# Patient Record
Sex: FEMALE | Race: BLACK OR AFRICAN AMERICAN | NOT HISPANIC OR LATINO | Employment: FULL TIME | URBAN - METROPOLITAN AREA
[De-identification: names, ages, dates, MRNs, and addresses within clinical notes are randomized per-mention and may not be internally consistent; named-entity substitution may affect disease eponyms.]

---

## 2017-09-15 ENCOUNTER — HOSPITAL ENCOUNTER (EMERGENCY)
Facility: OTHER | Age: 32
Discharge: HOME | End: 2017-09-15
Attending: EMERGENCY MEDICINE
Payer: MEDICARE

## 2017-09-15 VITALS
SYSTOLIC BLOOD PRESSURE: 138 MMHG | OXYGEN SATURATION: 98 % | TEMPERATURE: 99 F | BODY MASS INDEX: 27.99 KG/M2 | HEIGHT: 65 IN | WEIGHT: 168 LBS | HEART RATE: 97 BPM | DIASTOLIC BLOOD PRESSURE: 78 MMHG | RESPIRATION RATE: 18 BRPM

## 2017-09-15 DIAGNOSIS — N64.4 PAIN OF BOTH BREASTS: Primary | ICD-10-CM

## 2017-09-15 LAB
B-HCG UR QL: NEGATIVE
CTP QC/QA: YES

## 2017-09-15 PROCEDURE — 99283 EMERGENCY DEPT VISIT LOW MDM: CPT | Mod: 25

## 2017-09-15 PROCEDURE — 81025 URINE PREGNANCY TEST: CPT | Performed by: EMERGENCY MEDICINE

## 2017-09-15 PROCEDURE — 25000003 PHARM REV CODE 250: Performed by: EMERGENCY MEDICINE

## 2017-09-15 RX ORDER — HYDROCODONE BITARTRATE AND ACETAMINOPHEN 10; 325 MG/1; MG/1
TABLET ORAL
COMMUNITY
End: 2017-10-08

## 2017-09-15 RX ORDER — KETOROLAC TROMETHAMINE 10 MG/1
10 TABLET, FILM COATED ORAL EVERY 6 HOURS
Qty: 20 TABLET | Refills: 0 | Status: SHIPPED | OUTPATIENT
Start: 2017-09-15 | End: 2017-10-08

## 2017-09-15 RX ORDER — KETOROLAC TROMETHAMINE 10 MG/1
10 TABLET, FILM COATED ORAL
Status: COMPLETED | OUTPATIENT
Start: 2017-09-15 | End: 2017-09-15

## 2017-09-15 RX ADMIN — KETOROLAC TROMETHAMINE 10 MG: 10 TABLET, FILM COATED ORAL at 04:09

## 2017-09-15 NOTE — ED PROVIDER NOTES
"Encounter Date: 9/15/2017       History     Chief Complaint   Patient presents with    Breast Pain     patient reports having pain and lumps in each breast X2 weeks , patient reports pain as constant pain      Chief complaint: Breast pain  31-year-old says that she has pain to both breast for 2 weeks.  She has a history of lumps in her breasts in the past.  She noted the "lumps and pain"  2 weeks ago.  She has not taken anything for the pain.  No nipple discharge.  She has never had a mammogram.  She had an aunt and a grandmother with breast cancer but no first-degree relatives          Review of patient's allergies indicates:  No Known Allergies  History reviewed. No pertinent past medical history.  History reviewed. No pertinent surgical history.  Family History   Problem Relation Age of Onset    Hypertension Sister      Social History   Substance Use Topics    Smoking status: Current Every Day Smoker    Smokeless tobacco: Not on file    Alcohol use No     Review of Systems   Constitutional: Negative for fever.   Respiratory: Negative for shortness of breath.    Cardiovascular: Positive for chest pain.   Musculoskeletal:        Bilateral breast pain   Skin: Negative for color change and rash.       Physical Exam     Initial Vitals [09/15/17 1535]   BP Pulse Resp Temp SpO2   (!) 142/89 88 18 97.9 °F (36.6 °C) 100 %      MAP       106.67         Physical Exam    Nursing note and vitals reviewed.  Constitutional: No distress.   Cardiovascular: Normal rate.   Pulmonary/Chest: Breath sounds normal.   Mild tenderness to breasts, no definitive masses, no warmth, normal nipples   Neurological: She is alert and oriented to person, place, and time.   Skin: Skin is warm and dry.   Psychiatric: She has a normal mood and affect.         ED Course   Procedures  Labs Reviewed   POCT URINE PREGNANCY             Medical Decision Making:   Initial Assessment:   31-year-old who complains of pain to both breasts.  On exam it did " not detect any discrete masses but she does have tenderness.  No evidence of abscess  ED Management:  Patient will be treated with Toradol and referred to primary care physician for outpatient mammogram.  Unfortunately there is no definitive test that can be performed in this emergency department today.                   ED Course      Clinical Impression:   The encounter diagnosis was Pain of both breasts.                           Brynn Rae MD  09/15/17 1640

## 2017-09-15 NOTE — ED NOTES
Pt no answer for triage, not in waiting room. Per registration, pt left stating she was going to  her kids and Dr. Farrell said it was ok to leave the patient on the board. Registration to notifiy rn when pt returns.

## 2017-09-15 NOTE — ED NOTES
Patient has verified the spelling of their name and  on armband    LOC: The patient is awake, alert, and aware of environment with an appropriate affect, the patient is oriented x 4 and speaking appropriately.     APPEARANCE: Patient resting comfortably and in no acute distress, patient is clean and well groomed, patient's clothing is properly fastened.     SKIN: Bilateral breast pain.      COMPLAINT: Patient complain of bilateral chronic breast pain. Denies any trauma.

## 2017-10-08 ENCOUNTER — HOSPITAL ENCOUNTER (EMERGENCY)
Facility: OTHER | Age: 32
Discharge: HOME | End: 2017-10-08
Attending: INTERNAL MEDICINE
Payer: MEDICARE

## 2017-10-08 VITALS
SYSTOLIC BLOOD PRESSURE: 138 MMHG | TEMPERATURE: 99 F | HEART RATE: 92 BPM | DIASTOLIC BLOOD PRESSURE: 87 MMHG | RESPIRATION RATE: 18 BRPM | OXYGEN SATURATION: 98 %
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
## 2017-10-08 DIAGNOSIS — S16.1XXA STRAIN OF NECK MUSCLE, INITIAL ENCOUNTER: Primary | ICD-10-CM

## 2017-10-08 LAB
B-HCG UR QL: NEGATIVE
CTP QC/QA: YES

## 2017-10-08 PROCEDURE — 81025 URINE PREGNANCY TEST: CPT | Performed by: INTERNAL MEDICINE

## 2017-10-08 PROCEDURE — 25000003 PHARM REV CODE 250: Performed by: INTERNAL MEDICINE

## 2017-10-08 PROCEDURE — 99284 EMERGENCY DEPT VISIT MOD MDM: CPT | Mod: 25

## 2017-10-08 RX ORDER — IBUPROFEN 800 MG/1
800 TABLET ORAL EVERY 8 HOURS PRN
Qty: 30 TABLET | Refills: 0 | Status: SHIPPED | OUTPATIENT
Start: 2017-10-08 | End: 2018-03-28 | Stop reason: SDUPTHER

## 2017-10-08 RX ORDER — METHOCARBAMOL 750 MG/1
1500 TABLET, FILM COATED ORAL 3 TIMES DAILY
Qty: 30 TABLET | Refills: 0 | Status: SHIPPED | OUTPATIENT
Start: 2017-10-08 | End: 2017-10-13

## 2017-10-08 RX ORDER — ACETAMINOPHEN 500 MG
1000 TABLET ORAL
Status: COMPLETED | OUTPATIENT
Start: 2017-10-08 | End: 2017-10-08

## 2017-10-08 RX ADMIN — ACETAMINOPHEN 1000 MG: 500 TABLET ORAL at 10:10
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
## 2018-03-11 ENCOUNTER — HOSPITAL ENCOUNTER (EMERGENCY)
Facility: OTHER | Age: 33
Discharge: HOME | End: 2018-03-11
Attending: EMERGENCY MEDICINE
Payer: MEDICARE

## 2018-03-11 VITALS
RESPIRATION RATE: 16 BRPM | OXYGEN SATURATION: 99 % | WEIGHT: 155 LBS | DIASTOLIC BLOOD PRESSURE: 95 MMHG | HEIGHT: 65 IN | TEMPERATURE: 98 F | SYSTOLIC BLOOD PRESSURE: 162 MMHG | HEART RATE: 86 BPM | BODY MASS INDEX: 25.83 KG/M2

## 2018-03-11 DIAGNOSIS — H10.31 ACUTE CONJUNCTIVITIS OF RIGHT EYE, UNSPECIFIED ACUTE CONJUNCTIVITIS TYPE: Primary | ICD-10-CM

## 2018-03-11 LAB
B-HCG UR QL: NEGATIVE
CTP QC/QA: YES

## 2018-03-11 PROCEDURE — 81025 URINE PREGNANCY TEST: CPT | Performed by: EMERGENCY MEDICINE

## 2018-03-11 PROCEDURE — 99283 EMERGENCY DEPT VISIT LOW MDM: CPT

## 2018-03-11 RX ORDER — CIPROFLOXACIN HYDROCHLORIDE 3 MG/ML
SOLUTION/ DROPS OPHTHALMIC
Qty: 5 ML | Refills: 0 | Status: SHIPPED | OUTPATIENT
Start: 2018-03-11 | End: 2019-04-24

## 2018-03-11 NOTE — ED PROVIDER NOTES
"Encounter Date: 3/11/2018       History     Chief Complaint   Patient presents with    Conjunctivitis     Pt states, " My right eye is crusted over since this morning."     The history is provided by the patient. No  was used.   Conjunctivitis    The current episode started yesterday. The problem occurs continuously. The problem has been gradually worsening. The problem is mild. Nothing relieves the symptoms. The symptoms are aggravated by light. Associated symptoms include eye itching, eye discharge and eye redness. Pertinent negatives include no fever, no abdominal pain, no constipation, no diarrhea, no nausea, no vomiting, no congestion, no ear discharge, no headaches, no hearing loss, no mouth sores, no rhinorrhea, no sore throat, no neck pain, no cough, no wheezing, no rash and no eye pain.     Review of patient's allergies indicates:  No Known Allergies  History reviewed. No pertinent past medical history.  History reviewed. No pertinent surgical history.  Family History   Problem Relation Age of Onset    Hypertension Sister      Social History   Substance Use Topics    Smoking status: Current Every Day Smoker     Packs/day: 0.50    Smokeless tobacco: Never Used    Alcohol use No     Review of Systems   Constitutional: Negative.  Negative for appetite change, chills, diaphoresis and fever.   HENT: Negative.  Negative for congestion, dental problem, ear discharge, hearing loss, mouth sores, postnasal drip, rhinorrhea, sinus pain, sinus pressure, sore throat and trouble swallowing.    Eyes: Positive for discharge, redness and itching. Negative for pain.   Respiratory: Negative.  Negative for cough, shortness of breath and wheezing.    Cardiovascular: Negative.  Negative for chest pain and palpitations.   Gastrointestinal: Negative.  Negative for abdominal distention, abdominal pain, constipation, diarrhea, nausea, rectal pain and vomiting.   Endocrine: Negative.    Genitourinary: " Negative.  Negative for difficulty urinating, dyspareunia, dysuria, flank pain, hematuria, menstrual problem, vaginal bleeding, vaginal discharge and vaginal pain.   Musculoskeletal: Negative.  Negative for back pain and neck pain.   Skin: Negative.  Negative for rash.   Allergic/Immunologic: Negative.    Neurological: Negative.  Negative for dizziness, syncope, facial asymmetry, speech difficulty, weakness, light-headedness and headaches.   Hematological: Negative.  Does not bruise/bleed easily.   Psychiatric/Behavioral: Negative.  Negative for agitation, dysphoric mood, hallucinations, self-injury, sleep disturbance and suicidal ideas.   All other systems reviewed and are negative.      Physical Exam     Initial Vitals [03/11/18 1512]   BP Pulse Resp Temp SpO2   (!) 162/95 86 16 97.9 °F (36.6 °C) 99 %      MAP       117.33         Physical Exam    Nursing note and vitals reviewed.  Constitutional: She appears well-developed and well-nourished. She is not diaphoretic.  Non-toxic appearance. She does not appear ill. No distress.   HENT:   Head: Normocephalic and atraumatic.   Eyes: EOM and lids are normal. Pupils are equal, round, and reactive to light. Lids are everted and swept, no foreign bodies found. Right eye exhibits no discharge. Left eye exhibits no discharge. Right conjunctiva is injected. Right conjunctiva has no hemorrhage. Left conjunctiva is not injected. Left conjunctiva has no hemorrhage.       Neck: Normal range of motion.   Cardiovascular: Normal rate, regular rhythm, normal heart sounds and intact distal pulses. Exam reveals no gallop and no friction rub.    No murmur heard.  Pulmonary/Chest: Breath sounds normal. No respiratory distress. She has no wheezes. She has no rhonchi. She has no rales. She exhibits no tenderness.   Musculoskeletal: Normal range of motion.   Neurological: She is alert and oriented to person, place, and time.   Skin: Skin is warm and dry. No rash noted.   Psychiatric: She  has a normal mood and affect. Her behavior is normal. Judgment and thought content normal.         ED Course   Procedures  Labs Reviewed   POCT URINE PREGNANCY             Medical Decision Making:   Initial Assessment:   Right conjunctivitis  Differential Diagnosis:   Blepharitis, iritis  ED Management:  Due to lack of eye pain, injury or sensation of foreign body, no eye stain was done at this time.  The patient will be discharged home on ciprofloxacin eyedrops due to the fact that she wears contacts.  The patient is instructed to follow-up with [facility] on tomorrow, where sunglasses while in direct sunlight and return to the ER as needed if symptoms worsen or fail to improve.  The patient verbalized an understanding of discharge instructions and treatment plan.                      Clinical Impression:   The encounter diagnosis was Acute conjunctivitis of right eye, unspecified acute conjunctivitis type.                           Toussaint Battley III, YARI  03/11/18 1735

## 2018-03-28 ENCOUNTER — OFFICE VISIT (OUTPATIENT)
Dept: URGENT CARE | Facility: CLINIC | Age: 33
End: 2018-03-28
Payer: MEDICARE

## 2018-03-28 VITALS
BODY MASS INDEX: 25.79 KG/M2 | SYSTOLIC BLOOD PRESSURE: 139 MMHG | OXYGEN SATURATION: 98 % | WEIGHT: 155 LBS | HEART RATE: 128 BPM | DIASTOLIC BLOOD PRESSURE: 90 MMHG | TEMPERATURE: 97 F

## 2018-03-28 DIAGNOSIS — S40.011A CONTUSION OF RIGHT SHOULDER, INITIAL ENCOUNTER: ICD-10-CM

## 2018-03-28 DIAGNOSIS — S50.11XA CONTUSION OF RIGHT FOREARM, INITIAL ENCOUNTER: ICD-10-CM

## 2018-03-28 DIAGNOSIS — S46.911A STRAIN OF RIGHT SHOULDER, INITIAL ENCOUNTER: Primary | ICD-10-CM

## 2018-03-28 DIAGNOSIS — M25.511 ACUTE PAIN OF RIGHT SHOULDER: ICD-10-CM

## 2018-03-28 PROCEDURE — 99203 OFFICE O/P NEW LOW 30 MIN: CPT | Mod: S$GLB,,, | Performed by: FAMILY MEDICINE

## 2018-03-28 RX ORDER — IBUPROFEN 800 MG/1
800 TABLET ORAL EVERY 8 HOURS PRN
Qty: 30 TABLET | Refills: 0 | Status: SHIPPED | OUTPATIENT
Start: 2018-03-28 | End: 2019-01-19

## 2018-03-28 RX ORDER — IBUPROFEN 800 MG/1
800 TABLET ORAL EVERY 8 HOURS PRN
Qty: 30 TABLET | Refills: 0 | Status: SHIPPED | OUTPATIENT
Start: 2018-03-28 | End: 2018-03-28 | Stop reason: SDUPTHER

## 2018-03-28 RX ORDER — TIZANIDINE 4 MG/1
4 TABLET ORAL EVERY 12 HOURS PRN
Qty: 20 TABLET | Refills: 1 | Status: SHIPPED | OUTPATIENT
Start: 2018-03-28 | End: 2018-04-07

## 2018-03-28 NOTE — PATIENT INSTRUCTIONS
Shoulder Contusion  You have a shoulder injury called a contusion. This causes pain, swelling, and sometimes bruising on the skin. You dont have any broken bones. This injury will take from a few days to several weeks to heal, depending on how severe it is. Moderate to severe shoulder contusions are treated with a sling or shoulder immobilizer. Minor contusions can be treated without any special support.  Home care  Follow these tips when caring for yourself at home:  · If you were given a sling to use, leave it in place for the time advised by your healthcare provider. If you arent sure how long to wear it, ask for advice. If the sling becomes loose, adjust it so that your forearm is level with the ground. Your shoulder should feel well supported.  · Put an ice pack on the injured area for 20 minutes every 1 to 2 hours the first day. You can make your own ice pack by putting ice cubes in a plastic bag. Wrap the bag in a thin towel. Continue with ice packs 3 to 4 times a day for the next 2 days. Then use the pack as needed to ease pain and swelling.  · You may use acetaminophen or ibuprofen to control pain, unless another pain medicine was prescribed. If you have chronic liver or kidney disease, talk with your healthcare provider before using these medicines. Also talk with your provider if youve ever had a stomach ulcer or GI bleeding.  · Shoulder and elbow joints become stiff if left in a sling for too long. You should start range of motion exercises about 7 to 10 days after the injury. Talk with your provider to find out what type of exercises to do and how soon to start.  · Unless your provider told you otherwise, you can take the sling off to shower or bathe.  Follow-up care  Follow up with your healthcare provider if you dont start getting better in the next 5 days.  When to seek medical advice  Call your healthcare provider right away if any of these occur:  · Pain or swelling gets worse or continues  for more than a few days  · Large amount of bruising on your shoulder or upper arm  · Your hand or fingers become cold, blue, numb, or tingly  · Difficulty moving your hand or fingers  · Weakness in your hand or fingers  · Your shoulder becomes stiff  · Your shoulder feels like it is popping out  · You arent able to do your daily activities  Date Last Reviewed: 10/1/2016  © 6597-7790 [employer]. [address] [place] [address], [place] [address]. All rights reserved. This information is not intended as a substitute for professional medical care. Always follow your healthcare professional's instructions.        Muscle Strain in the Extremities  A muscle strain is a stretching and tearing of muscle fibers. This causes pain, especially when you move that muscle. There may also be some swelling and bruising.  Home care  · Keep the hurt area raised to reduce pain and swelling. This is especially important during the first 48 hours.  · Apply an ice pack over the injured area for 15 to 20 minutes every 3 to 6 hours. You should do this for the first 24 to 48 hours. You can make an ice pack by filling a plastic bag that seals at the top with ice cubes and then wrapping it with a thin towel. Be careful not to injure your skin with the ice treatments. Ice should never be applied directly to skin. Continue the use of ice packs for relief of pain and swelling as needed. After 48 hours, apply heat (warm shower or warm bath) for 15 to 20 minutes several times a day, or alternate ice and heat.  · You may use over-the-counter pain medicine to control pain, unless another medicine was prescribed. If you have chronic liver or kidney disease or ever had a stomach ulcer or GI bleeding, talk with your healthcare provider before using these medicines.  · For leg strains: If crutches have been recommended, dont put full weight on the hurt leg until you can do so without pain. You can return to sports when you are able to hop and  run on the injured leg without pain.  Follow-up care  Follow up with your healthcare provider, or as advised.  When to seek medical advice  Call your healthcare provider right away if any of these occur:  · The toes of the injured leg become swollen, cold, blue, numb, or tingly  · Pain or swelling increases  Date Last Reviewed: 11/19/2015 © 2000-2017 [employer]. [address] [place] [address], [place] [address]. All rights reserved. This information is not intended as a substitute for professional medical care. Always follow your healthcare professional's instructions.        Exercises for Shoulder Flexibility: External Rotation    This stretch can help restore shoulder flexibility and relieve pain over time. When stretching, be sure to breathe deeply. Follow any special instructions from your doctor or physical therapist:  1.  a doorway. Grasp the doorjamb with the hand on the frozen side. Your arm should be bent.  2. With the other hand, hold the elbow on the frozen side firmly against your body.  3. Standing in the same spot, rotate your body away from the doorjamb. Stop when you feel the stretch in the shoulder. At first, try to hold the stretch for 5 seconds.  4. Work up to doing 3 sets of this stretch, 3 times a day. Work up to holding the stretch for 30 to 60 seconds.  Note: Keep your arms as still as you can. Over time, rotate your body a little more to enhance the stretch. But be careful not to twist your back.  Frozen shoulder  Frozen shoulder is another name for adhesive capsulitis, which causes restricted movement in the shoulder. If you have frozen shoulder, this stretch may cause discomfort, especially when you first get started. A few months may pass before you achieve the results you want. But once your shoulder heals, it rarely becomes frozen again. So stick to your stretching program. If you have any questions, be sure to ask your doctor.   Date Last Reviewed: 8/16/2015 © 2000-2017  [employer]. [address] [place], [place] [id number]. All rights reserved. This information is not intended as a substitute for professional medical care. Always follow your healthcare professional's instructions.        Exercises for Shoulder Flexibility: Internal Rotation    This stretch can help restore shoulder flexibility and relieve pain over time. When stretching, be sure to breathe deeply. Follow any special instructions from your healthcare provider or physical therapist.  5. While seated, move the arm on the side you want to stretch toward the middle of your back. The palm of your hand should face out.  6. Cup your other hand under the hand thats behind your back. Gently push your cupped hand upward until you feel the stretch in the shoulder. Try to hold the stretch for 5 seconds.  7. Work up to doing 3 sets of this stretch, 3 times a day. Work up to holding the stretch for 30 to 60 seconds.  Note: Keep your back straight. Its OK if your hand cant reach the middle of your back. Instead, start the stretch with your hand as close as you can get it to the middle of your back.     Frozen shoulder  Frozen shoulder is another name for adhesive capsulitis. This causes restricted movement in the shoulder. If you have frozen shoulder, this stretch may cause discomfort, especially when you first get started. A few months may pass before you achieve the results you want. But once your shoulder heals, it rarely becomes frozen again. So stick to your stretching program. If you have any questions, be sure to ask your healthcare provider.   Date Last Reviewed: 10/14/2015  © 3075-9548 [employer]. [address] [place] [address]. All rights reserved. This information is not intended as a substitute for professional medical care. Always follow your healthcare professional's instructions.        RICE     Rest an injury, elevate it, and use ice and compression as directed.   RICE  stands for rest, ice, compression, and elevation. These can limit pain and swelling after an injury. RICE may be recommended to help treat fractures, sprains, strains, and bruises or bumps.   Home care  The following explain the details of RICE:  · Rest. Limit the use of the injured body part. This helps prevent further damage to the body part and gives it time to heal. In some cases, you may need a sling, brace, splint, or cast to help keep the body part still until it has healed.  · Ice. Applying ice right after an injury helps relieve pain and swelling. Wrap a bag of ice in a thin towel. Then, place it over the injured area. Do this for 10 to 15 minutes every 3 to 4 hours. Continue for the next 1 to 3 days or until your symptoms improve. Never put ice directly on your skin or ice an area longer than 15 minutes at a time.  · Compression. Putting pressure on an injury helps reduce swelling and provides support. Wrap the injured area firmly with an elastic bandage/wrap. Make sure not to wrap the bandage too tightly or you will cut off blood flow to the injured area. If your bandage loosens, rewrap it.  · Elevation. Keeping an injury raised above the level of your heart reduces swelling, pain, and throbbing. For instance, if you have a broken leg, it may help to rest your leg on several pillows when sitting or lying down. Try to keep the injured area elevated for at least 2 to 3 hours per day.  Follow-up care  Follow up with your healthcare provider, or as advised.  When to seek medical advice  Call your healthcare provider right away if any of these occur:  · Fever of 100.4°F (38°C) or higher, or as directed by your healthcare provider  · Increased pain or swelling in the injured body part  · Injured body part becomes cold, blue, numb, or tingly  · Signs of infection. These include warmth in the skin, redness, drainage, or bad smell coming from the injured body part.  Date Last Reviewed: 1/18/2016  © 5421-9213 [employer]. [address] [place] [address], [place] [address]. All rights reserved. This information is not intended as a substitute for professional medical care. Always follow your healthcare professional's instructions.

## 2018-03-28 NOTE — LETTER
March 28, 2018      [facility] [address], [address]  [place] [address]  Phone: [phone]  Fax: [phone]       Patient: Rad Handley   YOB: 1985  Date of Visit: 03/28/2018    To Whom It May Concern:    Arsenio Handley  was at [facility] on 03/28/2018. She may return to work/school on 04/01/2018 with no restrictions. If you have any questions or concerns, or if I can be of further assistance, please do not hesitate to contact me.    Sincerely,        Boris Padilla MD

## 2018-03-28 NOTE — PROGRESS NOTES
Subjective:       Patient ID: Rad Handley is a 32 y.o. female.    Vitals:  weight is 70.3 kg (155 lb). Her temperature is 97 °F (36.1 °C). Her blood pressure is 139/90 (abnormal) and her pulse is 128 (abnormal). Her oxygen saturation is 98%.     Chief Complaint: Shoulder Injury    Shoulder Injury    The incident occurred at home. The right shoulder is affected. The incident occurred 1 to 3 hours ago. The injury mechanism was a fall. The pain radiates to the right arm. The pain is at a severity of 10/10. The pain is severe. Pertinent negatives include no chest pain. The symptoms are aggravated by movement and overhead lifting. She has tried nothing for the symptoms.     Review of Systems   Constitution: Negative for chills and fever.   HENT: Negative for sore throat.    Eyes: Negative for blurred vision.   Cardiovascular: Negative for chest pain.   Respiratory: Negative for shortness of breath.    Skin: Negative for rash.   Musculoskeletal: Positive for joint pain and joint swelling. Negative for back pain.   Gastrointestinal: Negative for abdominal pain, diarrhea, nausea and vomiting.   Neurological: Negative for headaches.   Psychiatric/Behavioral: The patient is not nervous/anxious.    All other systems reviewed and are negative.      Objective:      Physical Exam   Constitutional: She is oriented to person, place, and time. She appears well-developed and well-nourished. She appears distressed (pain).   HENT:   Head: Normocephalic.   Nose: Nose normal.   Mouth/Throat: Oropharynx is clear and moist.   Eyes: EOM are normal. Pupils are equal, round, and reactive to light.   Neck: Normal range of motion.   Cardiovascular: Normal rate and regular rhythm.    Pulmonary/Chest: Effort normal.   Musculoskeletal:        Right shoulder: She exhibits decreased range of motion, tenderness, pain and spasm.        Right elbow: She exhibits decreased range of motion.        Right wrist: She exhibits decreased range of motion  and tenderness. She exhibits no laceration.   Neurological: She is alert and oriented to person, place, and time.   Skin: Skin is warm.       Assessment:       1. Strain of right shoulder, initial encounter    2. Contusion of right forearm, initial encounter    3. Contusion of right shoulder, initial encounter    4. Acute pain of right shoulder        Plan:         Strain of right shoulder, initial encounter  -     Discontinue: ibuprofen (ADVIL,MOTRIN) 800 MG tablet; Take 1 tablet (800 mg total) by mouth every 8 (eight) hours as needed for Pain.  Dispense: 30 tablet; Refill: 0  -     tiZANidine (ZANAFLEX) 4 MG tablet; Take 1 tablet (4 mg total) by mouth every 12 (twelve) hours as needed.  Dispense: 20 tablet; Refill: 1  -     ibuprofen (ADVIL,MOTRIN) 800 MG tablet; Take 1 tablet (800 mg total) by mouth every 8 (eight) hours as needed for Pain.  Dispense: 30 tablet; Refill: 0  -     SLING FOR HOME USE    Contusion of right forearm, initial encounter  -     Discontinue: ibuprofen (ADVIL,MOTRIN) 800 MG tablet; Take 1 tablet (800 mg total) by mouth every 8 (eight) hours as needed for Pain.  Dispense: 30 tablet; Refill: 0  -     ibuprofen (ADVIL,MOTRIN) 800 MG tablet; Take 1 tablet (800 mg total) by mouth every 8 (eight) hours as needed for Pain.  Dispense: 30 tablet; Refill: 0    Contusion of right shoulder, initial encounter  -     Discontinue: ibuprofen (ADVIL,MOTRIN) 800 MG tablet; Take 1 tablet (800 mg total) by mouth every 8 (eight) hours as needed for Pain.  Dispense: 30 tablet; Refill: 0  -     ibuprofen (ADVIL,MOTRIN) 800 MG tablet; Take 1 tablet (800 mg total) by mouth every 8 (eight) hours as needed for Pain.  Dispense: 30 tablet; Refill: 0    Acute pain of right shoulder  -     X-Ray Shoulder Trauma 3 view Right; Future; Expected date: 03/28/2018  -     Discontinue: ibuprofen (ADVIL,MOTRIN) 800 MG tablet; Take 1 tablet (800 mg total) by mouth every 8 (eight) hours as needed for Pain.  Dispense: 30 tablet;  Refill: 0  -     ibuprofen (ADVIL,MOTRIN) 800 MG tablet; Take 1 tablet (800 mg total) by mouth every 8 (eight) hours as needed for Pain.  Dispense: 30 tablet; Refill: 0      Patient Instructions       Shoulder Contusion  You have a shoulder injury called a contusion. This causes pain, swelling, and sometimes bruising on the skin. You dont have any broken bones. This injury will take from a few days to several weeks to heal, depending on how severe it is. Moderate to severe shoulder contusions are treated with a sling or shoulder immobilizer. Minor contusions can be treated without any special support.  Home care  Follow these tips when caring for yourself at home:  · If you were given a sling to use, leave it in place for the time advised by your healthcare provider. If you arent sure how long to wear it, ask for advice. If the sling becomes loose, adjust it so that your forearm is level with the ground. Your shoulder should feel well supported.  · Put an ice pack on the injured area for 20 minutes every 1 to 2 hours the first day. You can make your own ice pack by putting ice cubes in a plastic bag. Wrap the bag in a thin towel. Continue with ice packs 3 to 4 times a day for the next 2 days. Then use the pack as needed to ease pain and swelling.  · You may use acetaminophen or ibuprofen to control pain, unless another pain medicine was prescribed. If you have chronic liver or kidney disease, talk with your healthcare provider before using these medicines. Also talk with your provider if youve ever had a stomach ulcer or GI bleeding.  · Shoulder and elbow joints become stiff if left in a sling for too long. You should start range of motion exercises about 7 to 10 days after the injury. Talk with your provider to find out what type of exercises to do and how soon to start.  · Unless your provider told you otherwise, you can take the sling off to shower or bathe.  Follow-up care  Follow up with your healthcare  provider if you dont start getting better in the next 5 days.  When to seek medical advice  Call your healthcare provider right away if any of these occur:  · Pain or swelling gets worse or continues for more than a few days  · Large amount of bruising on your shoulder or upper arm  · Your hand or fingers become cold, blue, numb, or tingly  · Difficulty moving your hand or fingers  · Weakness in your hand or fingers  · Your shoulder becomes stiff  · Your shoulder feels like it is popping out  · You arent able to do your daily activities  Date Last Reviewed: 10/1/2016  © 1641-4930 [employer]. [address] [place] [address], [place] [address]. All rights reserved. This information is not intended as a substitute for professional medical care. Always follow your healthcare professional's instructions.        Muscle Strain in the Extremities  A muscle strain is a stretching and tearing of muscle fibers. This causes pain, especially when you move that muscle. There may also be some swelling and bruising.  Home care  · Keep the hurt area raised to reduce pain and swelling. This is especially important during the first 48 hours.  · Apply an ice pack over the injured area for 15 to 20 minutes every 3 to 6 hours. You should do this for the first 24 to 48 hours. You can make an ice pack by filling a plastic bag that seals at the top with ice cubes and then wrapping it with a thin towel. Be careful not to injure your skin with the ice treatments. Ice should never be applied directly to skin. Continue the use of ice packs for relief of pain and swelling as needed. After 48 hours, apply heat (warm shower or warm bath) for 15 to 20 minutes several times a day, or alternate ice and heat.  · You may use over-the-counter pain medicine to control pain, unless another medicine was prescribed. If you have chronic liver or kidney disease or ever had a stomach ulcer or GI bleeding, talk with your healthcare provider before using  these medicines.  · For leg strains: If crutches have been recommended, dont put full weight on the hurt leg until you can do so without pain. You can return to sports when you are able to hop and run on the injured leg without pain.  Follow-up care  Follow up with your healthcare provider, or as advised.  When to seek medical advice  Call your healthcare provider right away if any of these occur:  · The toes of the injured leg become swollen, cold, blue, numb, or tingly  · Pain or swelling increases  Date Last Reviewed: 11/19/2015  © 9648-4345 [employer]. [address] [place], [place] [id number]. All rights reserved. This information is not intended as a substitute for professional medical care. Always follow your healthcare professional's instructions.        Exercises for Shoulder Flexibility: External Rotation    This stretch can help restore shoulder flexibility and relieve pain over time. When stretching, be sure to breathe deeply. Follow any special instructions from your doctor or physical therapist:  1.  a doorway. Grasp the doorjamb with the hand on the frozen side. Your arm should be bent.  2. With the other hand, hold the elbow on the frozen side firmly against your body.  3. Standing in the same spot, rotate your body away from the doorjamb. Stop when you feel the stretch in the shoulder. At first, try to hold the stretch for 5 seconds.  4. Work up to doing 3 sets of this stretch, 3 times a day. Work up to holding the stretch for 30 to 60 seconds.  Note: Keep your arms as still as you can. Over time, rotate your body a little more to enhance the stretch. But be careful not to twist your back.  Frozen shoulder  Frozen shoulder is another name for adhesive capsulitis, which causes restricted movement in the shoulder. If you have frozen shoulder, this stretch may cause discomfort, especially when you first get started. A few months may pass before you achieve the results you want.  But once your shoulder heals, it rarely becomes frozen again. So stick to your stretching program. If you have any questions, be sure to ask your doctor.   Date Last Reviewed: 8/16/2015  © 3121-5374 [employer]. [address] [place] [address]. All rights reserved. This information is not intended as a substitute for professional medical care. Always follow your healthcare professional's instructions.        Exercises for Shoulder Flexibility: Internal Rotation    This stretch can help restore shoulder flexibility and relieve pain over time. When stretching, be sure to breathe deeply. Follow any special instructions from your healthcare provider or physical therapist.  5. While seated, move the arm on the side you want to stretch toward the middle of your back. The palm of your hand should face out.  6. Cup your other hand under the hand thats behind your back. Gently push your cupped hand upward until you feel the stretch in the shoulder. Try to hold the stretch for 5 seconds.  7. Work up to doing 3 sets of this stretch, 3 times a day. Work up to holding the stretch for 30 to 60 seconds.  Note: Keep your back straight. Its OK if your hand cant reach the middle of your back. Instead, start the stretch with your hand as close as you can get it to the middle of your back.     Frozen shoulder  Frozen shoulder is another name for adhesive capsulitis. This causes restricted movement in the shoulder. If you have frozen shoulder, this stretch may cause discomfort, especially when you first get started. A few months may pass before you achieve the results you want. But once your shoulder heals, it rarely becomes frozen again. So stick to your stretching program. If you have any questions, be sure to ask your healthcare provider.   Date Last Reviewed: 10/14/2015  © 9482-5603 [employer]. [address] [place] [address]. All rights reserved. This information is not intended  as a substitute for professional medical care. Always follow your healthcare professional's instructions.        RICE     Rest an injury, elevate it, and use ice and compression as directed.   RICE stands for rest, ice, compression, and elevation. These can limit pain and swelling after an injury. RICE may be recommended to help treat fractures, sprains, strains, and bruises or bumps.   Home care  The following explain the details of RICE:  · Rest. Limit the use of the injured body part. This helps prevent further damage to the body part and gives it time to heal. In some cases, you may need a sling, brace, splint, or cast to help keep the body part still until it has healed.  · Ice. Applying ice right after an injury helps relieve pain and swelling. Wrap a bag of ice in a thin towel. Then, place it over the injured area. Do this for 10 to 15 minutes every 3 to 4 hours. Continue for the next 1 to 3 days or until your symptoms improve. Never put ice directly on your skin or ice an area longer than 15 minutes at a time.  · Compression. Putting pressure on an injury helps reduce swelling and provides support. Wrap the injured area firmly with an elastic bandage/wrap. Make sure not to wrap the bandage too tightly or you will cut off blood flow to the injured area. If your bandage loosens, rewrap it.  · Elevation. Keeping an injury raised above the level of your heart reduces swelling, pain, and throbbing. For instance, if you have a broken leg, it may help to rest your leg on several pillows when sitting or lying down. Try to keep the injured area elevated for at least 2 to 3 hours per day.  Follow-up care  Follow up with your healthcare provider, or as advised.  When to seek medical advice  Call your healthcare provider right away if any of these occur:  · Fever of 100.4°F (38°C) or higher, or as directed by your healthcare provider  · Increased pain or swelling in the injured body part  · Injured body part becomes  cold, blue, numb, or tingly  · Signs of infection. These include warmth in the skin, redness, drainage, or bad smell coming from the injured body part.  Date Last Reviewed: 1/18/2016  © 8711-2182 [employer]. [address] [place] [address]. All rights reserved. This information is not intended as a substitute for professional medical care. Always follow your healthcare professional's instructions.

## 2018-11-08 ENCOUNTER — OFFICE VISIT (OUTPATIENT)
Dept: URGENT CARE | Facility: CLINIC | Age: 33
End: 2018-11-08
Payer: MEDICARE

## 2018-11-08 VITALS
BODY MASS INDEX: 24.38 KG/M2 | HEART RATE: 88 BPM | OXYGEN SATURATION: 98 % | DIASTOLIC BLOOD PRESSURE: 78 MMHG | WEIGHT: 146.5 LBS | SYSTOLIC BLOOD PRESSURE: 137 MMHG | TEMPERATURE: 98 F

## 2018-11-08 DIAGNOSIS — R30.0 DYSURIA: ICD-10-CM

## 2018-11-08 DIAGNOSIS — R31.9 URINARY TRACT INFECTION WITH HEMATURIA, SITE UNSPECIFIED: Primary | ICD-10-CM

## 2018-11-08 DIAGNOSIS — N39.0 URINARY TRACT INFECTION WITH HEMATURIA, SITE UNSPECIFIED: Primary | ICD-10-CM

## 2018-11-08 LAB
BILIRUB UR QL STRIP: NEGATIVE
GLUCOSE UR QL STRIP: NEGATIVE
KETONES UR QL STRIP: NEGATIVE
LEUKOCYTE ESTERASE UR QL STRIP: POSITIVE
PH, POC UA: 7.5 (ref 5–8)
POC BLOOD, URINE: POSITIVE
POC NITRATES, URINE: NEGATIVE
PROT UR QL STRIP: POSITIVE
SP GR UR STRIP: 1.01 (ref 1–1.03)
UROBILINOGEN UR STRIP-ACNC: NORMAL (ref 0.1–1.1)

## 2018-11-08 PROCEDURE — 99214 OFFICE O/P EST MOD 30 MIN: CPT | Mod: 25,S$GLB,, | Performed by: NURSE PRACTITIONER

## 2018-11-08 PROCEDURE — 81003 URINALYSIS AUTO W/O SCOPE: CPT | Mod: QW,S$GLB,, | Performed by: NURSE PRACTITIONER

## 2018-11-08 RX ORDER — PHENAZOPYRIDINE HYDROCHLORIDE 200 MG/1
200 TABLET, FILM COATED ORAL 3 TIMES DAILY PRN
Qty: 15 TABLET | Refills: 0 | Status: SHIPPED | OUTPATIENT
Start: 2018-11-08 | End: 2018-11-13

## 2018-11-08 RX ORDER — NITROFURANTOIN 25; 75 MG/1; MG/1
100 CAPSULE ORAL 2 TIMES DAILY
Qty: 14 CAPSULE | Refills: 0 | Status: SHIPPED | OUTPATIENT
Start: 2018-11-08 | End: 2018-11-15

## 2018-11-08 NOTE — PATIENT INSTRUCTIONS
"  DRINK LOTS OF WATER    TAKE THE FULL 7 DAYS OF THE MACROBID    PYRIDIUM IS FOR BURNING- TAKE IT AS NEEDED THREE TIMES A DAY     Bladder Infection, Female (Adult)    Urine is normally doesn't have any bacteria in it. But bacteria can get into the urinary tract from the skin around the rectum. Or they can travel in the blood from elsewhere in the body. Once they are in your urinary tract, they can cause infection in the urethra (urethritis), the bladder (cystitis), or the kidneys (pyelonephritis).  The most common place for an infection is in the bladder. This is called a bladder infection. This is one of the most common infections in women. Most bladder infections are easily treated. They are not serious unless the infection spreads to the kidney.  The phrases "bladder infection," "UTI," and "cystitis" are often used to describe the same thing. But they are not always the same. Cystitis is an inflammation of the bladder. The most common cause of cystitis is an infection.  Symptoms  The infection causes inflammation in the urethra and bladder. This causes many of the symptoms. The most common symptoms of a bladder infection are:  · Pain or burning when urinating  · Having to urinate more often than usual  · Urgent need to urinate  · Only a small amount of urine comes out  · Blood in urine  · Abdominal discomfort. This is usually in the lower abdomen above the pubic bone.  · Cloudy urine  · Strong- or bad-smelling urine  · Unable to urinate (urinary retention)  · Unable to hold urine in (urinary incontinence)  · Fever  · Loss of appetite  · Confusion (in older adults)  Causes  Bladder infections are not contagious. You can't get one from someone else, from a toilet seat, or from sharing a bath.  The most common cause of bladder infections is bacteria from the bowels. The bacteria get onto the skin around the opening of the urethra. From there, they can get into the urine and travel up to the bladder, causing " inflammation and infection. This usually happens because of:  · Wiping improperly after urinating. Always wipe from front to back.  · Bowel incontinence  · Pregnancy  · Procedures such as having a catheter inserted  · Older age  · Not emptying your bladder. This can allow bacteria a chance to grow in your urine.  · Dehydration  · Constipation  · Sex  · Use of a diaphragm for birth control   Treatment  Bladder infections are diagnosed by a urine test. They are treated with antibiotics and usually clear up quickly without complications. Treatment helps prevent a more serious kidney infection.  Medicines  Medicines can help in the treatment of a bladder infection:  · Take antibiotics until they are used up, even if you feel better. It is important to finish them to make sure the infection has cleared.  · You can use acetaminophen or ibuprofen for pain, fever, or discomfort, unless another medicine was prescribed. If you have chronic liver or kidney disease, talk with your healthcare provider before using these medicines. Also talk with your provider if you've ever had a stomach ulcer or gastrointestinal bleeding, or are taking blood-thinner medicines.  · If you are given phenazopydridine to reduce burning with urination, it will cause your urine to become a bright orange color. This can stain clothing.  Care and prevention  These self-care steps can help prevent future infections:  · Drink plenty of fluids to prevent dehydration and flush out your bladder. Do this unless you must restrict fluids for other health reasons, or your doctor told you not to.  · Proper cleaning after going to the bathroom is important. Wipe from front to back after using the toilet to prevent the spread of bacteria.  · Urinate more often. Don't try to hold urine in for a long time.  · Wear loose-fitting clothes and cotton underwear. Avoid tight-fitting pants.  · Improve your diet and prevent constipation. Eat more fresh fruit and vegetables,  and fiber, and less junk and fatty foods.  · Avoid sex until your symptoms are gone.  · Avoid caffeine, alcohol, and spicy foods. These can irritate your bladder.  · Urinate right after intercourse to flush out your bladder.  · If you use birth control pills and have frequent bladder infections, discuss it with your doctor.  Follow-up care  Call your healthcare provider if all symptoms are not gone after 3 days of treatment. This is especially important if you have repeat infections.  If a culture was done, you will be told if your treatment needs to be changed. If directed, you can call to find out the results.  If X-rays were done, you will be told if the results will affect your treatment.  Call 911  Call 911 if any of the following occur:  · Trouble breathing  · Hard to wake up or confusion  · Fainting or loss of consciousness  · Rapid heart rate  When to seek medical advice  Call your healthcare provider right away if any of these occur:  · Fever of 100.4ºF (38.0ºC) or higher, or as directed by your healthcare provider  · Symptoms are not better by the third day of treatment  · Back or belly (abdominal) pain that gets worse  · Repeated vomiting, or unable to keep medicine down  · Weakness or dizziness  · Vaginal discharge  · Pain, redness, or swelling in the outer vaginal area (labia)  Date Last Reviewed: 10/1/2016  © 4632-2210 [employer]. [address] [place] [address], [place] [address]. All rights reserved. This information is not intended as a substitute for professional medical care. Always follow your healthcare professional's instructions.

## 2018-11-08 NOTE — PROGRESS NOTES
Subjective:       Patient ID: Rad Handley is a 33 y.o. female.    Vitals:  weight is 66.5 kg (146 lb 8 oz). Her temperature is 98.1 °F (36.7 °C). Her blood pressure is 137/78 and her pulse is 88. Her oxygen saturation is 98%.     Chief Complaint: Urinary Tract Infection    Urinary Tract Infection    This is a new problem. The current episode started yesterday. The problem has been gradually worsening. The quality of the pain is described as burning. The pain is at a severity of 5/10. The pain is moderate. There has been no fever. She is not sexually active. There is no history of pyelonephritis. Pertinent negatives include no chills, hematuria, nausea, urgency or vomiting. She has tried antibiotics (2 (pcn)) for the symptoms. The treatment provided no relief.     Review of Systems   Constitution: Negative for chills and fever.   Skin: Negative for itching.   Musculoskeletal: Negative for back pain.   Gastrointestinal: Negative for abdominal pain, nausea and vomiting.   Genitourinary: Positive for dysuria. Negative for genital sores, hematuria, missed menses, non-menstrual bleeding and urgency.       Objective:      Physical Exam   Constitutional: She is oriented to person, place, and time. She appears well-developed and well-nourished.   HENT:   Head: Normocephalic and atraumatic.   Right Ear: External ear normal.   Left Ear: External ear normal.   Nose: Nose normal. No nasal deformity. No epistaxis.   Mouth/Throat: Oropharynx is clear and moist and mucous membranes are normal.   Eyes: Conjunctivae and lids are normal.   Neck: Trachea normal, normal range of motion and phonation normal. Neck supple.   Cardiovascular: Normal rate, regular rhythm, normal heart sounds and normal pulses.   Pulmonary/Chest: Effort normal and breath sounds normal.   Abdominal: Soft. Normal appearance and bowel sounds are normal. She exhibits no distension and no mass. There is no tenderness. There is no CVA tenderness.    Neurological: She is alert and oriented to person, place, and time.   Skin: Skin is warm, dry and intact.   Psychiatric: She has a normal mood and affect. Her speech is normal and behavior is normal. Cognition and memory are normal.   Nursing note and vitals reviewed.      Results for orders placed or performed in visit on 11/08/18   POCT Urinalysis, Dipstick, Automated, W/O Scope   Result Value Ref Range    POC Blood, Urine Positive (A) Negative    POC Bilirubin, Urine Negative Negative    POC Urobilinogen, Urine normal 0.1 - 1.1    POC Ketones, Urine Negative Negative    POC Protein, Urine Positive (A) Negative    POC Nitrates, Urine Negative Negative    POC Glucose, Urine Negative Negative    pH, UA 7.5 5 - 8    POC Specific Gravity, Urine 1.015 1.003 - 1.029    POC Leukocytes, Urine Positive (A) Negative     Assessment:       1. Urinary tract infection with hematuria, site unspecified    2. Dysuria        Plan:         Urinary tract infection with hematuria, site unspecified  -     nitrofurantoin, macrocrystal-monohydrate, (MACROBID) 100 MG capsule; Take 1 capsule (100 mg total) by mouth 2 (two) times daily. for 7 days  Dispense: 14 capsule; Refill: 0    Dysuria  -     POCT Urinalysis, Dipstick, Automated, W/O Scope  -     phenazopyridine (PYRIDIUM) 200 MG tablet; Take 1 tablet (200 mg total) by mouth 3 (three) times daily as needed for Pain.  Dispense: 15 tablet; Refill: 0      Patient Instructions     DRINK LOTS OF WATER    TAKE THE FULL 7 DAYS OF THE MACROBID    PYRIDIUM IS FOR BURNING- TAKE IT AS NEEDED THREE TIMES A DAY     Bladder Infection, Female (Adult)    Urine is normally doesn't have any bacteria in it. But bacteria can get into the urinary tract from the skin around the rectum. Or they can travel in the blood from elsewhere in the body. Once they are in your urinary tract, they can cause infection in the urethra (urethritis), the bladder (cystitis), or the kidneys (pyelonephritis).  The most  "common place for an infection is in the bladder. This is called a bladder infection. This is one of the most common infections in women. Most bladder infections are easily treated. They are not serious unless the infection spreads to the kidney.  The phrases "bladder infection," "UTI," and "cystitis" are often used to describe the same thing. But they are not always the same. Cystitis is an inflammation of the bladder. The most common cause of cystitis is an infection.  Symptoms  The infection causes inflammation in the urethra and bladder. This causes many of the symptoms. The most common symptoms of a bladder infection are:  · Pain or burning when urinating  · Having to urinate more often than usual  · Urgent need to urinate  · Only a small amount of urine comes out  · Blood in urine  · Abdominal discomfort. This is usually in the lower abdomen above the pubic bone.  · Cloudy urine  · Strong- or bad-smelling urine  · Unable to urinate (urinary retention)  · Unable to hold urine in (urinary incontinence)  · Fever  · Loss of appetite  · Confusion (in older adults)  Causes  Bladder infections are not contagious. You can't get one from someone else, from a toilet seat, or from sharing a bath.  The most common cause of bladder infections is bacteria from the bowels. The bacteria get onto the skin around the opening of the urethra. From there, they can get into the urine and travel up to the bladder, causing inflammation and infection. This usually happens because of:  · Wiping improperly after urinating. Always wipe from front to back.  · Bowel incontinence  · Pregnancy  · Procedures such as having a catheter inserted  · Older age  · Not emptying your bladder. This can allow bacteria a chance to grow in your urine.  · Dehydration  · Constipation  · Sex  · Use of a diaphragm for birth control   Treatment  Bladder infections are diagnosed by a urine test. They are treated with antibiotics and usually clear up quickly " without complications. Treatment helps prevent a more serious kidney infection.  Medicines  Medicines can help in the treatment of a bladder infection:  · Take antibiotics until they are used up, even if you feel better. It is important to finish them to make sure the infection has cleared.  · You can use acetaminophen or ibuprofen for pain, fever, or discomfort, unless another medicine was prescribed. If you have chronic liver or kidney disease, talk with your healthcare provider before using these medicines. Also talk with your provider if you've ever had a stomach ulcer or gastrointestinal bleeding, or are taking blood-thinner medicines.  · If you are given phenazopydridine to reduce burning with urination, it will cause your urine to become a bright orange color. This can stain clothing.  Care and prevention  These self-care steps can help prevent future infections:  · Drink plenty of fluids to prevent dehydration and flush out your bladder. Do this unless you must restrict fluids for other health reasons, or your doctor told you not to.  · Proper cleaning after going to the bathroom is important. Wipe from front to back after using the toilet to prevent the spread of bacteria.  · Urinate more often. Don't try to hold urine in for a long time.  · Wear loose-fitting clothes and cotton underwear. Avoid tight-fitting pants.  · Improve your diet and prevent constipation. Eat more fresh fruit and vegetables, and fiber, and less junk and fatty foods.  · Avoid sex until your symptoms are gone.  · Avoid caffeine, alcohol, and spicy foods. These can irritate your bladder.  · Urinate right after intercourse to flush out your bladder.  · If you use birth control pills and have frequent bladder infections, discuss it with your doctor.  Follow-up care  Call your healthcare provider if all symptoms are not gone after 3 days of treatment. This is especially important if you have repeat infections.  If a culture was done, you  will be told if your treatment needs to be changed. If directed, you can call to find out the results.  If X-rays were done, you will be told if the results will affect your treatment.  Call 911  Call 911 if any of the following occur:  · Trouble breathing  · Hard to wake up or confusion  · Fainting or loss of consciousness  · Rapid heart rate  When to seek medical advice  Call your healthcare provider right away if any of these occur:  · Fever of 100.4ºF (38.0ºC) or higher, or as directed by your healthcare provider  · Symptoms are not better by the third day of treatment  · Back or belly (abdominal) pain that gets worse  · Repeated vomiting, or unable to keep medicine down  · Weakness or dizziness  · Vaginal discharge  · Pain, redness, or swelling in the outer vaginal area (labia)  Date Last Reviewed: 10/1/2016  © 4584-2134 [employer]. [address] [place] [address], [place] [address]. All rights reserved. This information is not intended as a substitute for professional medical care. Always follow your healthcare professional's instructions.

## 2019-01-19 ENCOUNTER — HOSPITAL ENCOUNTER (EMERGENCY)
Facility: HOSPITAL | Age: 34
Discharge: HOME | End: 2019-01-19
Attending: EMERGENCY MEDICINE
Payer: MEDICARE

## 2019-01-19 VITALS
BODY MASS INDEX: 24.66 KG/M2 | HEART RATE: 72 BPM | DIASTOLIC BLOOD PRESSURE: 72 MMHG | HEIGHT: 65 IN | SYSTOLIC BLOOD PRESSURE: 140 MMHG | RESPIRATION RATE: 18 BRPM | OXYGEN SATURATION: 100 % | WEIGHT: 148 LBS | TEMPERATURE: 98 F

## 2019-01-19 DIAGNOSIS — N64.4 BREAST TENDERNESS IN FEMALE: Primary | ICD-10-CM

## 2019-01-19 LAB
B-HCG UR QL: NEGATIVE
CTP QC/QA: YES

## 2019-01-19 PROCEDURE — 99282 EMERGENCY DEPT VISIT SF MDM: CPT | Mod: ER

## 2019-01-19 PROCEDURE — 81025 URINE PREGNANCY TEST: CPT | Mod: ER | Performed by: EMERGENCY MEDICINE

## 2019-01-19 RX ORDER — IBUPROFEN 600 MG/1
600 TABLET ORAL EVERY 6 HOURS PRN
Qty: 30 TABLET | Refills: 0 | Status: SHIPPED | OUTPATIENT
Start: 2019-01-19 | End: 2019-04-24

## 2019-01-20 NOTE — ED PROVIDER NOTES
"Encounter Date: 1/19/2019       History     Chief Complaint   Patient presents with    Breast Problem     pt reports lumps in bilat breasts " i've had this problem since i was little but its getting worse." reports problem since she was 12 but pain is worse the last couple days      A nontoxic 33-year-old female who presents to the ED complaining of bilateral breast tenderness.  Patient reports lumps in her breast.  Patient has had lumps in her breasts she was 12 years old. She denies drainage from her breast. Patient states she has never had a mammogram.  Patient states her mother and his sister has had breast tumors.  Patient states she did not know what kind to tumor-rather it was benign or malignant.  Patient states she has never had a mammogram in the past. She stated her menstrual cycle today.       The history is provided by the patient.   Illness    The current episode started yesterday. The problem has been gradually worsening. The pain is at a severity of 6/10. Nothing relieves the symptoms. Nothing aggravates the symptoms. Pertinent negatives include no fever and no shortness of breath. Services received include medications given. Recently, medical care has been given at another facility.     Review of patient's allergies indicates:  No Known Allergies  History reviewed. No pertinent past medical history.  History reviewed. No pertinent surgical history.  Family History   Problem Relation Age of Onset    Hypertension Sister      Social History     Tobacco Use    Smoking status: Current Every Day Smoker     Packs/day: 0.50    Smokeless tobacco: Never Used   Substance Use Topics    Alcohol use: No    Drug use: Yes     Types: Marijuana     Review of Systems   Constitutional: Negative.  Negative for fever.   HENT: Negative.    Eyes: Negative.    Respiratory: Negative.  Negative for shortness of breath.    Cardiovascular: Negative for chest pain.        Bilateral breast tenderness   Gastrointestinal: " Negative.    Endocrine: Negative.    Genitourinary: Negative.    Musculoskeletal: Negative.    Skin: Negative.    Allergic/Immunologic: Negative.    Neurological: Negative.    Hematological: Negative.    All other systems reviewed and are negative.      Physical Exam     Initial Vitals [01/19/19 1730]   BP Pulse Resp Temp SpO2   (!) 140/72 72 18 98 °F (36.7 °C) 100 %      MAP       --         Physical Exam    Nursing note and vitals reviewed.  Constitutional: Vital signs are normal. She appears well-developed. She is cooperative. She does not appear ill.   HENT:   Right Ear: External ear normal.   Left Ear: External ear normal.   Nose: Nose normal.   Mouth/Throat: Oropharynx is clear and moist.   Eyes: Conjunctivae and lids are normal. Pupils are equal, round, and reactive to light.   Neck: Normal range of motion. Neck supple.   Cardiovascular: Normal rate, regular rhythm, S1 normal, S2 normal and normal heart sounds.   Pulmonary/Chest: Effort normal and breath sounds normal.   Breast exam performed with bilateral breast tenderness in the 3 o'clock position.  Dense breast tissue noted bilaterally.   Abdominal: Soft. Normal appearance. There is no tenderness.   Musculoskeletal: Normal range of motion.   From of all extremities   Neurological: She is alert and oriented to person, place, and time.   Skin: Skin is warm, dry and intact.   Psychiatric: She has a normal mood and affect. Her speech is normal. Cognition and memory are normal.         ED Course   Procedures  Labs Reviewed   POCT URINE PREGNANCY          Imaging Results    None          Medical Decision Making:   Initial Assessment:   A 33-year-old female who presents to the ED with complaints of bilateral breast tenderness which started on yesterday.  Patient states she has had lumps in her breasts since she was 12 years old.  Patient has never had a mammogram.  Patient started her menstrual cycle today.  Differential Diagnosis:   Breast tenderness  ED  Management:  Referred to PCP for a mammogram.  Patient instructed on importance of getting a mammogram.  Follow-up with PCP in 2-3 days.                      Clinical Impression:   The encounter diagnosis was Breast tenderness in female.                             DELFIN Patten  01/19/19 1807       DELFIN Patten  01/19/19 1807

## 2019-04-24 ENCOUNTER — OFFICE VISIT (OUTPATIENT)
Dept: OBSTETRICS AND GYNECOLOGY | Facility: CLINIC | Age: 34
End: 2019-04-24
Payer: MEDICARE

## 2019-04-24 VITALS
SYSTOLIC BLOOD PRESSURE: 130 MMHG | WEIGHT: 134.13 LBS | DIASTOLIC BLOOD PRESSURE: 82 MMHG | HEIGHT: 65 IN | BODY MASS INDEX: 22.35 KG/M2

## 2019-04-24 DIAGNOSIS — N76.4 VULVAR ABSCESS: Primary | ICD-10-CM

## 2019-04-24 PROCEDURE — 99213 OFFICE O/P EST LOW 20 MIN: CPT | Mod: PBBFAC | Performed by: OBSTETRICS & GYNECOLOGY

## 2019-04-24 PROCEDURE — 99205 OFFICE O/P NEW HI 60 MIN: CPT | Mod: S$PBB,25,, | Performed by: OBSTETRICS & GYNECOLOGY

## 2019-04-24 PROCEDURE — 99205 PR OFFICE/OUTPT VISIT, NEW, LEVL V, 60-74 MIN: ICD-10-PCS | Mod: S$PBB,25,, | Performed by: OBSTETRICS & GYNECOLOGY

## 2019-04-24 PROCEDURE — 87070 CULTURE OTHR SPECIMN AEROBIC: CPT

## 2019-04-24 PROCEDURE — 56405 PR I&D OF VULVA/PERINEUM ABSCESS: ICD-10-PCS | Mod: S$PBB,,, | Performed by: OBSTETRICS & GYNECOLOGY

## 2019-04-24 PROCEDURE — 87076 CULTURE ANAEROBE IDENT EACH: CPT

## 2019-04-24 PROCEDURE — 99999 PR PBB SHADOW E&M-EST. PATIENT-LVL III: CPT | Mod: PBBFAC,,, | Performed by: OBSTETRICS & GYNECOLOGY

## 2019-04-24 PROCEDURE — 99999 PR PBB SHADOW E&M-EST. PATIENT-LVL III: ICD-10-PCS | Mod: PBBFAC,,, | Performed by: OBSTETRICS & GYNECOLOGY

## 2019-04-24 PROCEDURE — 56405 I&D VULVA/PERINEAL ABSCESS: CPT | Mod: PBBFAC | Performed by: OBSTETRICS & GYNECOLOGY

## 2019-04-24 PROCEDURE — 56405 I&D VULVA/PERINEAL ABSCESS: CPT | Mod: S$PBB,,, | Performed by: OBSTETRICS & GYNECOLOGY

## 2019-04-24 PROCEDURE — 87075 CULTR BACTERIA EXCEPT BLOOD: CPT

## 2019-04-24 RX ORDER — ALBUTEROL SULFATE 90 UG/1
AEROSOL, METERED RESPIRATORY (INHALATION)
Refills: 0 | COMMUNITY
Start: 2019-02-11 | End: 2022-10-15

## 2019-04-24 RX ORDER — SULFAMETHOXAZOLE AND TRIMETHOPRIM 800; 160 MG/1; MG/1
1 TABLET ORAL 2 TIMES DAILY
Qty: 28 TABLET | Refills: 0 | Status: SHIPPED | OUTPATIENT
Start: 2019-04-24 | End: 2019-05-08

## 2019-04-24 NOTE — PATIENT INSTRUCTIONS
Abscess (Incision & Drainage)  An abscess is sometimes called a boil. It happens when bacteria get trapped under the skin and start to grow. Pus forms inside the abscess as the body responds to the bacteria. An abscess can happen with an insect bite, ingrown hair, blocked oil gland, pimple, cyst, or puncture wound.  Your healthcare provider has drained the pus from your abscess. If the abscess pocket was large, your healthcare provider may have put in gauze packing. Your provider will need to remove it on your next visit. He or she may also replace it at that time. You may not need antibiotics to treat a simple abscess, unless the infection is spreading into the skin around the wound (cellulitis).  The wound will take about 1 to 2 weeks to heal, depending on the size of the abscess. Healthy tissue will grow from the bottom and sides of the opening until it seals over.  Home care  These tips can help your wound heal:  · The wound may drain for the first 2 days. Cover the wound with a clean dry dressing. Change the dressing if it becomes soaked with blood or pus.  · If a gauze packing was placed inside the abscess pocket, you may be told to remove it yourself. You may do this in the shower. Once the packing is removed, you should wash the area in the shower, or clean the area as directed by your provider. Continue to do this until the skin opening has closed. Make sure you wash your hands after changing the packing or cleaning the wound.  · If you were prescribed antibiotics, take them as directed until they are all gone.  · You may use acetaminophen or ibuprofen to control pain, unless another pain medicine was prescribed. If you have liver disease or ever had a stomach ulcer, talk with your doctor before using these medicines.  Follow-up care  Follow up with your healthcare provider, or as advised. If a gauze packing was put in your wound, it should be removed in 1 to 2 days. Check your wound every day for any  signs that the infection is getting worse. The signs are listed below.  When to seek medical advice  Call your healthcare provider right away if any of these occur:  · Increasing redness or swelling  · Red streaks in the skin leading away from the wound  · Increasing local pain or swelling  · Continued pus draining from the wound 2 days after treatment  · Fever of 100.4ºF (38ºC) or higher, or as directed by your healthcare provider  · Boil returns when you are at home  Date Last Reviewed: 9/1/2016  © 6347-8691 [employer]. [address] [place] [address]. All rights reserved. This information is not intended as a substitute for professional medical care. Always follow your healthcare professional's instructions.

## 2019-04-29 LAB
BACTERIA SPEC AEROBE CULT: NO GROWTH
BACTERIA SPEC ANAEROBE CULT: NORMAL

## 2019-06-04 NOTE — PROGRESS NOTES
History & Physical  Gynecology      SUBJECTIVE:     Chief Complaint: Recurrent Skin Infections       History of Present Illness:  Ms. Handley is a 34 y/o female who presents to clinic c/o swollen, painful bump on her vagina. She reports that the bump started small but she continued to pick at which has made it bigger and more painful. She reports that the area is so tender that it difficult for her to sit. She denies nausea, vomiting, fever and chills. She reports she has not been seen any other provider for this issue.      Review of patient's allergies indicates:  No Known Allergies    History reviewed. No pertinent past medical history.  History reviewed. No pertinent surgical history.  OB History        1    Para   1    Term           1    AB        Living   1       SAB        TAB        Ectopic        Multiple        Live Births   1               Family History   Problem Relation Age of Onset    Hypertension Sister      Social History     Tobacco Use    Smoking status: Current Every Day Smoker     Packs/day: 0.50    Smokeless tobacco: Never Used   Substance Use Topics    Alcohol use: No    Drug use: Yes     Types: Marijuana       Current Outpatient Medications   Medication Sig    PROAIR HFA 90 mcg/actuation inhaler TAKE 2 PUFFS BY MOUTH EVERY 6 HOURS AS NEEDED FOR WHEEZING     No current facility-administered medications for this visit.          Review of Systems:  Review of Systems   Constitutional: Negative for chills and fever.   Respiratory: Negative for cough and wheezing.    Cardiovascular: Negative for chest pain and palpitations.   Gastrointestinal: Negative for abdominal pain, nausea and vomiting.   Genitourinary: Negative for dysuria, frequency, hematuria, pelvic pain, vaginal bleeding, vaginal discharge and vaginal pain.        Perineal abscess        OBJECTIVE:     Physical Exam:  Physical Exam   Constitutional: She is oriented to person, place, and time. She appears well-developed  and well-nourished.   Cardiovascular: Normal rate.   Pulmonary/Chest: Effort normal. No respiratory distress.   Abdominal: Soft. She exhibits no distension. There is no tenderness.   Genitourinary:         Neurological: She is alert and oriented to person, place, and time.   Skin: Skin is warm and dry.   Psychiatric: She has a normal mood and affect.   Nursing note and vitals reviewed.        ASSESSMENT:       ICD-10-CM ICD-9-CM    1. Vulvar abscess N76.4 616.4 sulfamethoxazole-trimethoprim 800-160mg (BACTRIM DS) 800-160 mg Tab      Culture, Anaerobic      Aerobic culture        Plan:      Rad HDZ was seen today for recurrent skin infections.    Diagnoses and all orders for this visit:    Vulvar abscess  -     sulfamethoxazole-trimethoprim 800-160mg (BACTRIM DS) 800-160 mg Tab; Take 1 tablet by mouth 2 (two) times daily. for 14 days  -     Culture, Anaerobic  -     Aerobic culture  - Discussed with patient the use of warm compresses or sitz baths 2-3x  day to assist with healing of vulvar abscess s/p I&D  - Discussed with patient to exfoliate pubic hair and complete antibiotics       Orders Placed This Encounter   Procedures    Culture, Anaerobic    Aerobic culture       Follow up in about 9 days (around 5/3/2019).     Counseling time: 30 minutes    Shruti Piedra

## 2019-06-06 NOTE — PROGRESS NOTES
PERINEAL ABSCESS INCISION & DRAINAGE    Rad Handley is a 33 y.o. female  presents for incision and drainage of a perineal abscess     The patient was informed of a possible failure rate for I&D.  The patient was informed of the risk of bleeding, pain, and infection; she was also counseled on the alternatives to I&D and agrees to proceed.  Her allergy list was reviewed to confirm no allergy to latex.  A time out was performed.    There is a 1.5cm abscess of the perineum.  After betadine swab x 3, the base of the absecesst was injected with 1% lidocaine.  A stab incision was made into the duct with an #11 blade in the center to the hymenal ring.  A small Hemostat was used to widen the incision. The exudate was expressed from the abscess and cultured. No packing was able to fit into the cavity so it was not packed.       Assessment:  Incision and Drainage    Post I&D counseling:  The patient was counseled to manage pain with NSAIDs, Tylenol, an prescriptions per the medication card.  She was told to expect drainage from the site and to ease discomfort with sitz baths or drying well with a cool hair dryer.  She was asked to maintain pelvic rest until the site was healed and to report foul-smelling discharge, heavy bleeding, fever greater than 101.0F, or worsening pain.  Counseling lasted about 15 minutes; after counseling the patient had no further questions.

## 2019-09-01 ENCOUNTER — HOSPITAL ENCOUNTER (EMERGENCY)
Facility: HOSPITAL | Age: 34
Discharge: HOME | End: 2019-09-01
Attending: EMERGENCY MEDICINE
Payer: MEDICARE

## 2019-09-01 VITALS
HEIGHT: 65 IN | TEMPERATURE: 99 F | DIASTOLIC BLOOD PRESSURE: 88 MMHG | HEART RATE: 97 BPM | WEIGHT: 143 LBS | OXYGEN SATURATION: 99 % | RESPIRATION RATE: 20 BRPM | BODY MASS INDEX: 23.82 KG/M2 | SYSTOLIC BLOOD PRESSURE: 142 MMHG

## 2019-09-01 DIAGNOSIS — L02.31 ABSCESS OF RIGHT BUTTOCK: Primary | ICD-10-CM

## 2019-09-01 LAB
B-HCG UR QL: NEGATIVE
CTP QC/QA: YES

## 2019-09-01 PROCEDURE — 87186 SC STD MICRODIL/AGAR DIL: CPT

## 2019-09-01 PROCEDURE — 81025 URINE PREGNANCY TEST: CPT | Mod: ER | Performed by: EMERGENCY MEDICINE

## 2019-09-01 PROCEDURE — 99282 EMERGENCY DEPT VISIT SF MDM: CPT | Mod: 25,ER

## 2019-09-01 PROCEDURE — 87077 CULTURE AEROBIC IDENTIFY: CPT

## 2019-09-01 PROCEDURE — 87070 CULTURE OTHR SPECIMN AEROBIC: CPT

## 2019-09-01 RX ORDER — HYDROCODONE BITARTRATE AND ACETAMINOPHEN 5; 325 MG/1; MG/1
1 TABLET ORAL EVERY 8 HOURS PRN
Qty: 12 TABLET | Refills: 0 | Status: SHIPPED | OUTPATIENT
Start: 2019-09-01

## 2019-09-01 RX ORDER — SULFAMETHOXAZOLE AND TRIMETHOPRIM 800; 160 MG/1; MG/1
1 TABLET ORAL 2 TIMES DAILY
Qty: 14 TABLET | Refills: 0 | Status: SHIPPED | OUTPATIENT
Start: 2019-09-01 | End: 2019-09-08

## 2019-09-01 NOTE — ED PROVIDER NOTES
Encounter Date: 9/1/2019    SCRIBE #1 NOTE: I, Marjorie Dong, am scribing for, and in the presence of,  Toussaint Battley, FNP. I have scribed the following portions of the note - Other sections scribed: HPI, ROS, PE.       History     Chief Complaint   Patient presents with    Abscess     possible abscess or insect bite to right buttock for 4 days     The history is provided by the patient. No  was used.   Abscess    This is a new problem. The current episode started several days ago. The problem occurs continuously. The problem has been gradually worsening. Affected Location: right buttock. The pain is at a severity of 5/10. The abscess is characterized by redness, painfulness and swelling. Pertinent negatives include no fever, no diarrhea, no vomiting, no congestion, no sore throat and no cough.     Review of patient's allergies indicates:  No Known Allergies  History reviewed. No pertinent past medical history.  History reviewed. No pertinent surgical history.  Family History   Problem Relation Age of Onset    Hypertension Sister      Social History     Tobacco Use    Smoking status: Current Every Day Smoker     Packs/day: 0.50    Smokeless tobacco: Never Used   Substance Use Topics    Alcohol use: No    Drug use: Yes     Types: Marijuana     Review of Systems   Constitutional: Negative.  Negative for appetite change, chills, diaphoresis and fever.   HENT: Negative.  Negative for congestion, dental problem, postnasal drip, sinus pressure, sinus pain and sore throat.    Eyes: Negative.  Negative for pain, discharge, redness and itching.   Respiratory: Negative.  Negative for cough, shortness of breath and wheezing.    Cardiovascular: Negative.  Negative for chest pain and palpitations.   Gastrointestinal: Negative.  Negative for abdominal pain, constipation, diarrhea, nausea and vomiting.   Endocrine: Negative.    Genitourinary: Negative.  Negative for difficulty urinating,  dysuria, flank pain, menstrual problem, vaginal bleeding, vaginal discharge and vaginal pain.   Musculoskeletal: Negative.  Negative for back pain, myalgias and neck pain.   Skin: Negative for rash.        abscess   Allergic/Immunologic: Negative.    Neurological: Negative.  Negative for dizziness, syncope, weakness, light-headedness and headaches.   Hematological: Negative.  Negative for adenopathy.   Psychiatric/Behavioral: Negative.  Negative for behavioral problems, hallucinations, self-injury and suicidal ideas.   All other systems reviewed and are negative.      Physical Exam     Initial Vitals [09/01/19 1655]   BP Pulse Resp Temp SpO2   (!) 161/102 97 20 99 °F (37.2 °C) 99 %      MAP       --         Physical Exam    Nursing note and vitals reviewed.  Constitutional: Vital signs are normal. She appears well-developed and well-nourished.   HENT:   Head: Normocephalic and atraumatic.   Right Ear: External ear normal.   Left Ear: External ear normal.   Nose: Nose normal.   Mouth/Throat: Oropharynx is clear and moist.   Eyes: Conjunctivae are normal.   Neck: Normal range of motion. Neck supple.   Cardiovascular: Normal rate, regular rhythm, normal heart sounds, intact distal pulses and normal pulses. Exam reveals no gallop, no friction rub and no decreased pulses.    No murmur heard.  Pulmonary/Chest: Effort normal and breath sounds normal. No respiratory distress. She has no wheezes. She has no rhonchi. She has no rales. She exhibits no tenderness.   Abdominal: Soft. Normal appearance and bowel sounds are normal. She exhibits no distension. There is no tenderness. There is no rigidity, no rebound and no guarding.   Neurological: She is alert and oriented to person, place, and time. No cranial nerve deficit. GCS score is 15. GCS eye subscore is 4. GCS verbal subscore is 5. GCS motor subscore is 6.   Skin: Skin is warm and dry. Capillary refill takes less than 2 seconds. Abscess (right buttock) noted. No rash  noted.   Psychiatric: She has a normal mood and affect.         ED Course   I & D - Incision and Drainage  Date/Time: 9/14/2019 8:24 PM  Location procedure was performed: [facility] EMERGENCY DEPARTMENT  Performed by: Toussaint Battley III, FNP  Authorized by: Brynn Rae MD   Pre-operative diagnosis: abscess  Post-operative diagnosis: abscess  Type: abscess  Location: right buttock.    Anesthesia:  Local Anesthetic: lidocaine 1% without epinephrine  Anesthetic total: 2 mL  Patient sedated: no  Scalpel size: 11  Incision type: single straight  Complexity: simple  Drainage: purulent  Drainage amount: moderate  Wound treatment: incision,  drainage,  deloculation,  expression of material and  wound packed  Packing material: 1/4 in gauze  Complications: No  Estimated blood loss (mL): less than 3cc.  SPECIMENS: aerobic culture.  Implants: No  Patient tolerance: Patient tolerated the procedure well with no immediate complications        Labs Reviewed   CULTURE, AEROBIC  (SPECIFY SOURCE)   POCT URINE PREGNANCY          Imaging Results    None          Medical Decision Making:   Initial Assessment:   Right buttock abscess  Differential Diagnosis:   Cellulitis  Clinical Tests:   Lab Tests: Ordered  ED Management:  Patient will be discharged home on Bactrim and [place] with instructions to return to the ER in 2 days for packing removal, refrain from removing packing, refrain from submerging wound into standing water getting it wet, and return iron to the ER sooner as needed if symptoms worsen or fail to improve.  The patient verbalized understanding of discharge instructions and treatment plan.            Scribe Attestation:   Scribe #1: I performed the above scribed service and the documentation accurately describes the services I performed. I attest to the accuracy of the note.               Clinical Impression:     1. Abscess of right buttock                                   Toussaint Battley III, FNP  09/14/19 2025

## 2019-09-03 ENCOUNTER — HOSPITAL ENCOUNTER (EMERGENCY)
Facility: HOSPITAL | Age: 34
Discharge: HOME | End: 2019-09-03
Attending: EMERGENCY MEDICINE
Payer: MEDICARE

## 2019-09-03 VITALS
BODY MASS INDEX: 23.66 KG/M2 | RESPIRATION RATE: 16 BRPM | TEMPERATURE: 99 F | HEIGHT: 65 IN | OXYGEN SATURATION: 100 % | HEART RATE: 102 BPM | DIASTOLIC BLOOD PRESSURE: 92 MMHG | WEIGHT: 142 LBS | SYSTOLIC BLOOD PRESSURE: 151 MMHG

## 2019-09-03 DIAGNOSIS — Z48.00 ABSCESS PACKING REMOVAL: Primary | ICD-10-CM

## 2019-09-03 LAB — BACTERIA SPEC AEROBE CULT: ABNORMAL

## 2019-09-03 PROCEDURE — 99282 EMERGENCY DEPT VISIT SF MDM: CPT | Mod: ER

## 2019-09-03 NOTE — ED PROVIDER NOTES
Encounter Date: 9/3/2019       History     Chief Complaint   Patient presents with    Wound Check     pt here have packing removed from abcess that was drained here 2 days ago , buttocks.  Taking antibiotics as prescribed, reports decreased pain     HPI  Review of patient's allergies indicates:  No Known Allergies  History reviewed. No pertinent past medical history.  History reviewed. No pertinent surgical history.  Family History   Problem Relation Age of Onset    Hypertension Sister      Social History     Tobacco Use    Smoking status: Current Every Day Smoker     Packs/day: 0.50    Smokeless tobacco: Never Used   Substance Use Topics    Alcohol use: No    Drug use: Yes     Types: Marijuana     Review of Systems    Physical Exam     Initial Vitals [09/03/19 1714]   BP Pulse Resp Temp SpO2   (!) 151/92 102 16 99 °F (37.2 °C) 100 %      MAP       --         Physical Exam    ED Course   Procedures  Labs Reviewed - No data to display       Imaging Results    None                               Clinical Impression:       ICD-10-CM ICD-9-CM   1. Abscess packing removal Z48.00 V58.30                                Favio Meneses MD  09/03/19 1733       Favio Meneses MD  09/03/19 1733

## 2019-09-03 NOTE — ED PROVIDER NOTES
Encounter Date: 9/3/2019    SCRIBE #1 NOTE: I, Amara Terrazas, am scribing for, and in the presence of,  Dr. Meneses. I have scribed the following portions of the note - Other sections scribed: HPI, ROS, PE.       History     Chief Complaint   Patient presents with    Wound Check     pt here have packing removed from abcess that was drained here 2 days ago , buttocks.  Taking antibiotics as prescribed, reports decreased pain     Rad Handley is a 33 y.o. female who presents to the ED for a wound check and packing removal form the right buttock.  Abscess was I&D 2 days ago and pt still reports pain which is gradually improving. Pt is taking antibiotics as prescribed.    The history is provided by the patient. No  was used.     Review of patient's allergies indicates:  No Known Allergies  History reviewed. No pertinent past medical history.  History reviewed. No pertinent surgical history.  Family History   Problem Relation Age of Onset    Hypertension Sister      Social History     Tobacco Use    Smoking status: Current Every Day Smoker     Packs/day: 0.50    Smokeless tobacco: Never Used   Substance Use Topics    Alcohol use: No    Drug use: Yes     Types: Marijuana     Review of Systems   Constitutional: Negative.  Negative for fever.   HENT: Negative.  Negative for sore throat.    Eyes: Negative.    Respiratory: Negative.  Negative for shortness of breath.    Cardiovascular: Negative.  Negative for chest pain.   Gastrointestinal: Negative.  Negative for nausea and vomiting.   Endocrine: Negative.    Genitourinary: Negative.  Negative for dysuria.   Musculoskeletal: Negative.  Negative for myalgias.   Skin: Positive for wound (check). Negative for rash.   Allergic/Immunologic: Negative.    Neurological: Negative.  Negative for headaches.   Hematological: Negative.  Negative for adenopathy.   Psychiatric/Behavioral: Negative.  Negative for behavioral problems.   All other systems reviewed  and are negative.      Physical Exam     Initial Vitals [09/03/19 1714]   BP Pulse Resp Temp SpO2   (!) 151/92 102 16 99 °F (37.2 °C) 100 %      MAP       --         Physical Exam    Nursing note and vitals reviewed.  Constitutional: She appears well-developed and well-nourished.   HENT:   Head: Normocephalic and atraumatic.   Right Ear: External ear normal.   Left Ear: External ear normal.   Nose: Nose normal.   Eyes: Conjunctivae are normal.   Neck: Normal range of motion. Neck supple.   Cardiovascular: Normal rate and intact distal pulses.   Pulmonary/Chest: Effort normal. No respiratory distress.   Abdominal: Soft. There is no tenderness.   Musculoskeletal: Normal range of motion.   Neurological: She is alert and oriented to person, place, and time.   Skin: Skin is warm and dry. Capillary refill takes less than 2 seconds.        Psychiatric: She has a normal mood and affect. Her behavior is normal.         ED Course   Procedures  Labs Reviewed - No data to display       Imaging Results    None                     Scribe Attestation:   Scribe #1: I performed the above scribed service and the documentation accurately describes the services I performed. I attest to the accuracy of the note.    This document was produced by a scribe under my direction and in my presence. I agree with the content of the note and have made any necessary edits.     Favio Meneses MD    09/03/2019 6:55 PM           Clinical Impression:     1. Abscess packing removal                                   Favio Meneses MD  09/03/19 [id number]

## 2019-09-03 NOTE — ED TRIAGE NOTES
Pt to ed to remove packing from abscess. Abscess noted to right buttock. Pt denies fever. Pt still reports pain. Drainage noted to bandage. Pt AAOx4. VSS. No distress

## 2019-09-06 NOTE — ADDENDUM NOTE
Encounter addended by: Aggie Carrillo on: 9/6/2019 6:25 PM   Actions taken: Charge Capture section accepted, Visit Navigator Flowsheet section accepted

## 2020-01-16 ENCOUNTER — HOSPITAL ENCOUNTER (EMERGENCY)
Facility: HOSPITAL | Age: 35
Discharge: HOME | End: 2020-01-16
Attending: INTERNAL MEDICINE
Payer: MEDICARE

## 2020-01-16 VITALS
HEART RATE: 105 BPM | WEIGHT: 137 LBS | RESPIRATION RATE: 20 BRPM | OXYGEN SATURATION: 99 % | BODY MASS INDEX: 22.82 KG/M2 | SYSTOLIC BLOOD PRESSURE: 138 MMHG | TEMPERATURE: 99 F | HEIGHT: 65 IN | DIASTOLIC BLOOD PRESSURE: 97 MMHG

## 2020-01-16 DIAGNOSIS — J10.1 INFLUENZA A: Primary | ICD-10-CM

## 2020-01-16 LAB
B-HCG UR QL: NEGATIVE
CTP QC/QA: YES
CTP QC/QA: YES
POC MOLECULAR INFLUENZA A AGN: POSITIVE
POC MOLECULAR INFLUENZA B AGN: NEGATIVE

## 2020-01-16 PROCEDURE — 81025 URINE PREGNANCY TEST: CPT | Mod: ER | Performed by: NURSE PRACTITIONER

## 2020-01-16 PROCEDURE — 99284 EMERGENCY DEPT VISIT MOD MDM: CPT | Mod: 25,ER

## 2020-01-16 PROCEDURE — 87502 INFLUENZA DNA AMP PROBE: CPT | Mod: ER

## 2020-01-16 RX ORDER — LEVOCETIRIZINE DIHYDROCHLORIDE 5 MG/1
5 TABLET, FILM COATED ORAL NIGHTLY
Qty: 30 TABLET | Refills: 0 | Status: SHIPPED | OUTPATIENT
Start: 2020-01-16

## 2020-01-16 RX ORDER — BENZONATATE 100 MG/1
100 CAPSULE ORAL EVERY 8 HOURS PRN
Qty: 20 CAPSULE | Refills: 0 | Status: SHIPPED | OUTPATIENT
Start: 2020-01-16

## 2020-01-16 RX ORDER — FLUTICASONE PROPIONATE 50 MCG
1 SPRAY, SUSPENSION (ML) NASAL 2 TIMES DAILY
Qty: 15 G | Refills: 0 | Status: SHIPPED | OUTPATIENT
Start: 2020-01-16

## 2020-01-16 RX ORDER — OSELTAMIVIR PHOSPHATE 75 MG/1
75 CAPSULE ORAL 2 TIMES DAILY
Qty: 10 CAPSULE | Refills: 0 | Status: SHIPPED | OUTPATIENT
Start: 2020-01-16 | End: 2020-01-21

## 2020-01-16 NOTE — ED PROVIDER NOTES
Encounter Date: 1/16/2020    SCRIBE #1 NOTE: I, Amara Terrazas, am scribing for, and in the presence of,  Toussaint Battley, FNP. I have scribed the following portions of the note - Other sections scribed: HPI, ROS, PE.       History     Chief Complaint   Patient presents with    Influenza     x 2 days    Headache     The history is provided by the patient. No  was used.   Influenza   This is a new problem. The current episode started 2 days ago. The problem occurs constantly. The problem has not changed since onset.Associated symptoms include headaches. Pertinent negatives include no chest pain, no abdominal pain and no shortness of breath. Nothing aggravates the symptoms. Nothing relieves the symptoms. She has tried nothing for the symptoms.   Headache    This is a new problem. The current episode started yesterday. The problem occurs constantly. The problem has been waxing and waning. The pain does not radiate. The pain quality is similar to prior headaches. The quality of the pain is described as aching. Associated symptoms include coughing, a fever and rhinorrhea. Pertinent negatives include no abdominal pain, back pain, blurred vision, dizziness, eye pain, eye redness, hearing loss, loss of balance, nausea, neck pain, numbness, phonophobia, photophobia, sinus pressure, sore throat, visual change, vomiting or weakness. The treatment provided no relief.     Review of patient's allergies indicates:  No Known Allergies  History reviewed. No pertinent past medical history.  History reviewed. No pertinent surgical history.  Family History   Problem Relation Age of Onset    Hypertension Sister      Social History     Tobacco Use    Smoking status: Current Every Day Smoker     Packs/day: 0.50    Smokeless tobacco: Never Used   Substance Use Topics    Alcohol use: No    Drug use: Yes     Types: Marijuana     Review of Systems   Constitutional: Positive for fever. Negative for appetite change,  chills and diaphoresis.   HENT: Positive for rhinorrhea. Negative for congestion, dental problem, ear discharge, hearing loss, mouth sores, postnasal drip, sinus pressure, sinus pain, sore throat and trouble swallowing.    Eyes: Negative.  Negative for blurred vision, photophobia, pain, discharge, redness and itching.   Respiratory: Positive for cough. Negative for shortness of breath and wheezing.    Cardiovascular: Negative.  Negative for chest pain and palpitations.   Gastrointestinal: Negative.  Negative for abdominal distention, abdominal pain, constipation, diarrhea, nausea, rectal pain and vomiting.   Endocrine: Negative.    Genitourinary: Negative.  Negative for difficulty urinating, dyspareunia, dysuria, flank pain, hematuria, menstrual problem, vaginal bleeding, vaginal discharge and vaginal pain.   Musculoskeletal: Positive for myalgias. Negative for back pain and neck pain.   Skin: Negative.  Negative for rash.   Allergic/Immunologic: Negative.    Neurological: Positive for headaches. Negative for dizziness, syncope, facial asymmetry, speech difficulty, weakness, light-headedness, numbness and loss of balance.   Hematological: Negative.  Negative for adenopathy.   Psychiatric/Behavioral: Negative.  Negative for agitation, behavioral problems, dysphoric mood, hallucinations, self-injury, sleep disturbance and suicidal ideas.   All other systems reviewed and are negative.      Physical Exam     Initial Vitals [01/16/20 1643]   BP Pulse Resp Temp SpO2   (!) 138/97 105 20 99.4 °F (37.4 °C) 99 %      MAP       --         Physical Exam    Nursing note and vitals reviewed.  Constitutional: She appears well-developed and well-nourished.   HENT:   Head: Normocephalic and atraumatic.   Right Ear: External ear normal.   Left Ear: External ear normal.   Nose: Mucosal edema present. No rhinorrhea.   Mouth/Throat: Uvula is midline, oropharynx is clear and moist and mucous membranes are normal. No uvula swelling. No  oropharyngeal exudate, posterior oropharyngeal edema, posterior oropharyngeal erythema or tonsillar abscesses.   Eyes: Conjunctivae and EOM are normal. Pupils are equal, round, and reactive to light.   Neck: Normal range of motion and phonation normal. Neck supple.   Cardiovascular: Normal rate, regular rhythm, normal heart sounds and intact distal pulses. Exam reveals no gallop and no friction rub.    No murmur heard.  Pulmonary/Chest: Effort normal and breath sounds normal. No stridor. No respiratory distress. She has no wheezes. She has no rhonchi. She has no rales. She exhibits no tenderness.   Abdominal: There is no rigidity.   Musculoskeletal: Normal range of motion. She exhibits no edema or tenderness.   Neurological: She is alert and oriented to person, place, and time. She has normal strength. No cranial nerve deficit or sensory deficit. GCS score is 15. GCS eye subscore is 4. GCS verbal subscore is 5. GCS motor subscore is 6.   Skin: Skin is warm and dry. Capillary refill takes less than 2 seconds. No rash noted.   Psychiatric: She has a normal mood and affect. Her behavior is normal.         ED Course   Procedures  Labs Reviewed   POCT INFLUENZA A/B MOLECULAR - Abnormal; Notable for the following components:       Result Value    POC Molecular Influenza A Ag Positive (*)     All other components within normal limits   POCT URINE PREGNANCY          Imaging Results    None          Medical Decision Making:   History:   Old Medical Records: I decided to obtain old medical records.  Initial Assessment:   Influenza  Differential Diagnosis:   URI with cough and congestion, bronchitis  Clinical Tests:   Lab Tests: Ordered and Reviewed  ED Management:  Influenza test positive for flu A    1) Pt instructed that her symptoms are viral and will subside on their own.  Patient will be discharged home on Flonase, Xyzal, Tessalon Perles and Tamiflu  2) Pt instructed to drink plenty of fluids to loosen secretions  3) Pt  instructed that he may take over-the-counter Mucinex (NOT DM) as needed  4) Pt instructed to take over-the-counter Tylenol or Motrin for fever/body aches   5) Pt instructed to return to ER as needed if symptoms worsen/fail to improve  6) Pt instructed to follow-up with primary care provider tomorrow  7) Pt verbalized understanding of discharge instructions and treatment plan              Scribe Attestation:   Scribe #1: I performed the above scribed service and the documentation accurately describes the services I performed. I attest to the accuracy of the note.                          Clinical Impression:     1. Influenza A                                Toussaint Battley III, [facility]  01/16/20 [id number]

## 2020-01-16 NOTE — ED NOTES
Pt AAOx3, ambulatory. C/o headache and flu symptoms.   Has infrequent cough. Lung fields diminished in bases. RR 22-24. Temp 99.4.

## 2021-04-11 ENCOUNTER — HOSPITAL ENCOUNTER (EMERGENCY)
Facility: HOSPITAL | Age: 36
Discharge: HOME | End: 2021-04-11
Attending: EMERGENCY MEDICINE
Payer: MEDICARE

## 2021-04-11 VITALS
OXYGEN SATURATION: 99 % | BODY MASS INDEX: 22.8 KG/M2 | SYSTOLIC BLOOD PRESSURE: 141 MMHG | TEMPERATURE: 99 F | RESPIRATION RATE: 20 BRPM | HEART RATE: 75 BPM | DIASTOLIC BLOOD PRESSURE: 89 MMHG | HEIGHT: 65 IN

## 2021-04-11 DIAGNOSIS — N64.4 BREAST PAIN IN FEMALE: Primary | ICD-10-CM

## 2021-04-11 LAB
B-HCG UR QL: NEGATIVE
CTP QC/QA: YES

## 2021-04-11 PROCEDURE — 25000003 PHARM REV CODE 250: Performed by: PHYSICIAN ASSISTANT

## 2021-04-11 PROCEDURE — 81025 URINE PREGNANCY TEST: CPT | Performed by: EMERGENCY MEDICINE

## 2021-04-11 PROCEDURE — 99283 EMERGENCY DEPT VISIT LOW MDM: CPT

## 2021-04-11 RX ORDER — IBUPROFEN 600 MG/1
600 TABLET ORAL
Status: COMPLETED | OUTPATIENT
Start: 2021-04-11 | End: 2021-04-11

## 2021-04-11 RX ADMIN — IBUPROFEN 600 MG: 600 TABLET, FILM COATED ORAL at 03:04

## 2021-04-12 ENCOUNTER — PATIENT MESSAGE (OUTPATIENT)
Dept: RESEARCH | Facility: HOSPITAL | Age: 36
End: 2021-04-12

## 2021-05-20 ENCOUNTER — TELEPHONE (OUTPATIENT)
Dept: OBSTETRICS AND GYNECOLOGY | Facility: CLINIC | Age: 36
End: 2021-05-20

## 2021-08-19 ENCOUNTER — IMMUNIZATION (OUTPATIENT)
Dept: OBSTETRICS AND GYNECOLOGY | Facility: CLINIC | Age: 36
End: 2021-08-19
Payer: MEDICARE

## 2021-08-19 DIAGNOSIS — Z23 NEED FOR VACCINATION: Primary | ICD-10-CM

## 2021-08-19 PROCEDURE — 0001A COVID-19, MRNA, LNP-S, PF, 30 MCG/0.3 ML DOSE VACCINE: CPT | Mod: CV19,,, | Performed by: FAMILY MEDICINE

## 2021-08-19 PROCEDURE — 91300 COVID-19, MRNA, LNP-S, PF, 30 MCG/0.3 ML DOSE VACCINE: CPT | Mod: ,,, | Performed by: FAMILY MEDICINE

## 2021-08-19 PROCEDURE — 91300 COVID-19, MRNA, LNP-S, PF, 30 MCG/0.3 ML DOSE VACCINE: ICD-10-PCS | Mod: ,,, | Performed by: FAMILY MEDICINE

## 2021-08-19 PROCEDURE — 0001A COVID-19, MRNA, LNP-S, PF, 30 MCG/0.3 ML DOSE VACCINE: ICD-10-PCS | Mod: CV19,,, | Performed by: FAMILY MEDICINE

## 2021-09-29 ENCOUNTER — IMMUNIZATION (OUTPATIENT)
Dept: OBSTETRICS AND GYNECOLOGY | Facility: CLINIC | Age: 36
End: 2021-09-29
Payer: MEDICAID

## 2021-09-29 DIAGNOSIS — Z23 NEED FOR VACCINATION: Primary | ICD-10-CM

## 2021-09-29 PROCEDURE — 0002A COVID-19, MRNA, LNP-S, PF, 30 MCG/0.3 ML DOSE VACCINE: CPT | Mod: PBBFAC | Performed by: FAMILY MEDICINE

## 2021-09-29 PROCEDURE — 91300 COVID-19, MRNA, LNP-S, PF, 30 MCG/0.3 ML DOSE VACCINE: CPT | Mod: PBBFAC

## 2022-01-11 ENCOUNTER — HOSPITAL ENCOUNTER (EMERGENCY)
Facility: HOSPITAL | Age: 37
Discharge: HOME | End: 2022-01-11
Attending: EMERGENCY MEDICINE
Payer: MEDICARE

## 2022-01-11 VITALS
HEIGHT: 65 IN | WEIGHT: 160 LBS | DIASTOLIC BLOOD PRESSURE: 70 MMHG | OXYGEN SATURATION: 100 % | BODY MASS INDEX: 26.66 KG/M2 | TEMPERATURE: 99 F | SYSTOLIC BLOOD PRESSURE: 124 MMHG | RESPIRATION RATE: 20 BRPM | HEART RATE: 77 BPM

## 2022-01-11 DIAGNOSIS — R07.9 CHEST PAIN: ICD-10-CM

## 2022-01-11 DIAGNOSIS — Z20.822 CLOSE EXPOSURE TO 2019 NOVEL CORONAVIRUS: ICD-10-CM

## 2022-01-11 DIAGNOSIS — Z20.822 COVID-19 VIRUS NOT DETECTED: ICD-10-CM

## 2022-01-11 DIAGNOSIS — Z87.898 HISTORY OF CHEST PAIN: Primary | ICD-10-CM

## 2022-01-11 LAB
CTP QC/QA: YES
SARS-COV-2 RDRP RESP QL NAA+PROBE: NEGATIVE

## 2022-01-11 PROCEDURE — 93010 EKG 12-LEAD: ICD-10-PCS | Mod: ,,, | Performed by: INTERNAL MEDICINE

## 2022-01-11 PROCEDURE — 93005 ELECTROCARDIOGRAM TRACING: CPT

## 2022-01-11 PROCEDURE — 99284 EMERGENCY DEPT VISIT MOD MDM: CPT | Mod: CS,,, | Performed by: EMERGENCY MEDICINE

## 2022-01-11 PROCEDURE — U0002 COVID-19 LAB TEST NON-CDC: HCPCS | Performed by: EMERGENCY MEDICINE

## 2022-01-11 PROCEDURE — 99284 PR EMERGENCY DEPT VISIT,LEVEL IV: ICD-10-PCS | Mod: CS,,, | Performed by: EMERGENCY MEDICINE

## 2022-01-11 PROCEDURE — 93010 ELECTROCARDIOGRAM REPORT: CPT | Mod: ,,, | Performed by: INTERNAL MEDICINE

## 2022-01-11 PROCEDURE — 99283 EMERGENCY DEPT VISIT LOW MDM: CPT | Mod: 25

## 2022-01-11 RX ORDER — NAPROXEN 500 MG/1
500 TABLET ORAL 2 TIMES DAILY WITH MEALS
Qty: 10 TABLET | Refills: 0 | Status: SHIPPED | OUTPATIENT
Start: 2022-01-11

## 2022-01-11 NOTE — ED NOTES
Patient identifiers for Rad Handley 36 y.o. female checked and correct.  Chief Complaint   Patient presents with    COVID-19 Concerns    Headache     Chest hurts with breathing     No past medical history on file.  Allergies reported: Review of patient's allergies indicates:  No Known Allergies      LOC: Patient is awake, alert, and aware of environment with an appropriate affect. Patient is oriented x 4 and speaking appropriately.  APPEARANCE: Patient resting comfortably and in no acute distress. Patient is clean and well groomed, patient's clothing is properly fastened.  HEENT: No congestion, MM p/m  SKIN: The skin is warm and dry. Patient has normal skin turgor and moist mucus membranes.   MUSKULOSKELETAL: Patient is moving all extremities well, no obvious deformities noted. Pulses intact.   RESPIRATORY: Airway is open and patent. Respirations are spontaneous and non-labored with normal effort and rate.  CARDIAC: Patient has a normal rate and rhythm. No peripheral edema noted.   ABDOMEN: No distention noted. Soft and non-tender upon palpation.  NEUROLOGICAL: pupils 3mm, PERRL. Facial expression is symmetrical. Hand grasps are equal bilaterally. Normal sensation in all extremities when touched with finger.

## 2022-01-11 NOTE — Clinical Note
"[facility] "Amilcar Handley was seen and treated in our emergency department on 1/11/2022.     COVID-19 is present in our communities across the state. There is limited testing for COVID at this time, so not all patients can be tested. In this situation, your employee meets the following criteria:    Rad Handley has met the criteria for COVID-19 testing and has a NEGATIVE result. The employee can return to work once they are asymptomatic for 24 hours without the use of fever reducing medications (Tylenol, Motrin, etc).     If the employee is not fully vaccinated and had a close contact:  · Retest at 5 to 7 days post-exposure  · If possible, it is recommended that they quarantine for 5 days from the time of contact regardless of their test status.  · A mask should be worn post quarantine for 5 days.  If you have any questions or concerns, or if I can be of further assistance, please do not hesitate to contact me.    Sincerely,             Vika Oliva RN"

## 2022-01-12 NOTE — ED PROVIDER NOTES
Encounter Date: 1/11/2022       History     Chief Complaint   Patient presents with    COVID-19 Concerns    Headache     Chest hurts with breathing     Patient is a 36yoF who presents for viral syndrome; no pertinent PMHx.  Patient reports several days of myalgias, fatigue, headache with intermittent anterior chest pain. CP occurs with coughing and inspiration, pain minimal at this time. Denies associated leg swelling, CP, shortness of breath, abdominal pain, dysuria.  Patient is vaccinated.  No known COVID exposure. No Measured fever at home. No OCP use or h/o blood clots.  The patients available PMH, PSH, Social History, medications, allergies, and triage vital signs were reviewed just prior to their medical evaluation.  A ten point review of systems was completed and is negative except as documented above.  Patient denies any other acute medical complaint.    Please be advised this text was dictated with OQO software and may contain errors due to translation.           Review of patient's allergies indicates:  No Known Allergies  No past medical history on file.  No past surgical history on file.  Family History   Problem Relation Age of Onset    Hypertension Sister      Social History     Tobacco Use    Smoking status: Current Every Day Smoker     Packs/day: 0.50    Smokeless tobacco: Never Used   Substance Use Topics    Alcohol use: No    Drug use: Yes     Types: Marijuana     Review of Systems   Constitutional: Negative for chills and fever.   HENT: Negative for sore throat.    Respiratory: Positive for cough. Negative for shortness of breath.    Cardiovascular: Positive for chest pain.   Gastrointestinal: Negative for abdominal pain, nausea and vomiting.   Genitourinary: Positive for flank pain. Negative for dysuria.   Musculoskeletal: Positive for myalgias. Negative for back pain.   Skin: Negative for rash.   Neurological: Positive for headaches. Negative for dizziness and weakness.    Hematological: Does not bruise/bleed easily.       Physical Exam     Initial Vitals [01/11/22 1103]   BP Pulse Resp Temp SpO2   (!) 144/74 82 18 99.1 °F (37.3 °C) 100 %      MAP       --         Physical Exam    Nursing note and vitals reviewed.  Constitutional: She appears well-developed and well-nourished. She is not diaphoretic. No distress.   HENT:   Head: Normocephalic and atraumatic.   Right Ear: External ear normal.   Left Ear: External ear normal.   Mouth/Throat: Oropharynx is clear and moist.   Eyes: Conjunctivae and EOM are normal. Pupils are equal, round, and reactive to light. No scleral icterus.   Neck: No JVD present.   Cardiovascular: Normal rate, regular rhythm and intact distal pulses.   Pulmonary/Chest: Breath sounds normal. No respiratory distress. She has no wheezes. She has no rhonchi. She has no rales. She exhibits tenderness (reproducible chest wall ttp).   Abdominal: Abdomen is soft. Bowel sounds are normal. There is no abdominal tenderness. There is no rebound and no guarding.   Musculoskeletal:         General: No edema. Normal range of motion.     Neurological: She is alert and oriented to person, place, and time. She has normal strength. No cranial nerve deficit or sensory deficit.   Skin: Skin is warm and dry. Capillary refill takes less than 2 seconds. No rash noted. No erythema. No pallor.   Psychiatric: She has a normal mood and affect. Her behavior is normal. Judgment and thought content normal.         ED Course   Procedures  Labs Reviewed   SARS-COV-2 RDRP GENE        ECG Results          EKG 12-lead (Final result)  Result time 01/11/22 12:56:28    Final result by Interface, Lab In [facility] (01/11/22 12:56:28)                 Narrative:    Test Reason : R07.9,    Vent. Rate : 078 BPM     Atrial Rate : 078 BPM     P-R Int : 134 ms          QRS Dur : 096 ms      QT Int : 374 ms       P-R-T Axes : 050 075 069 degrees     QTc Int : 426 ms    Normal sinus rhythm  Normal ECG  No  previous ECGs available  Confirmed by Leland DE LUNA MD ([id number]) on 1/11/2022 12:56:17 PM    Referred By: [email or web address]   SELF           Confirmed By:Leland DE LUNA MD                            Imaging Results    None          Medications - No data to display  Medical Decision Making:   History:   Old Medical Records: I decided to obtain old medical records.  Old Records Summarized: records from previous admission(s) and records from clinic visits.  Initial Assessment:   Patient presents for several days of viral URI symptoms- LTAB, VSS, afebrile    Differential Diagnosis:   Ddx includes Covid, Flu, chest wall pain, pericarditis, viral syndrome. Physical exam and history taking lower clinical suspicion for PNA, ACS, PE, sepsis, bacterial pharyngitis, bacterial sinusitis.     Independently Interpreted Test(s):   I have ordered and independently interpreted EKG Reading(s) - see prior notes  Clinical Tests:   Lab Tests: Ordered and Reviewed  Medical Tests: Ordered and Reviewed  ED Management:  Covid negative, patients partner is positive, possibly false negative or premature test. EKG shows NSR without iscehmic changes. PERC negative. Encouraged re-test if needed for work in several days. Naprosyn, conservative measures at home. Patient agreed to plan of care and voiced understanding. Discharged in stable condition with strict ED return precautions.    Sheila Barragan PA-C  01/12/2022      Additional MDM:   PERC Rule:   Age is greater than or equal to 50 = 0.0  Heart Rate is greater than or equal to 100 = 0.0  SaO2 on room air < 95% = 0.0  Unilateral leg swelling = 0.0  Hemoptysis = 0.0  Recent surgery or trauma = 0.0  Prior PE or DVT =  0.0  Hormone use = 0.00  PERC Score = 0          ED Course as of 01/12/22 1512   Tue Jan 11, 2022   1132 SpO2(S): 99 %  amb [MF]   1134 SARS-CoV-2 RNA, Amplification, Qual: Negative [MF]      ED Course User Index  [MF] Sheila Barragan PA-C             Clinical Impression:   Final  diagnoses:  [R07.9] Chest pain  [Z87.898] History of chest pain (Primary)  [Z20.822] Close exposure to 2019 novel coronavirus  [Z20.822] COVID-19 virus not detected          ED Disposition Condition    Discharge Stable        ED Prescriptions     Medication Sig Dispense Start Date End Date Auth. Provider    naproxen (NAPROSYN) 500 MG tablet Take 1 tablet (500 mg total) by mouth 2 (two) times daily with meals. 10 tablet 1/11/2022  Sheila Barragan PA-C        Follow-up Information    None          Sheila Barragan PA-C  01/12/22 1511

## 2022-10-15 ENCOUNTER — HOSPITAL ENCOUNTER (EMERGENCY)
Facility: OTHER | Age: 37
Discharge: HOME | End: 2022-10-15
Attending: EMERGENCY MEDICINE
Payer: MEDICARE

## 2022-10-15 VITALS
BODY MASS INDEX: 28.49 KG/M2 | TEMPERATURE: 98 F | RESPIRATION RATE: 18 BRPM | HEIGHT: 65 IN | OXYGEN SATURATION: 100 % | SYSTOLIC BLOOD PRESSURE: 143 MMHG | WEIGHT: 171 LBS | DIASTOLIC BLOOD PRESSURE: 68 MMHG | HEART RATE: 78 BPM

## 2022-10-15 DIAGNOSIS — R07.89 CHEST WALL PAIN: ICD-10-CM

## 2022-10-15 DIAGNOSIS — F17.200 TOBACCO DEPENDENCE: ICD-10-CM

## 2022-10-15 DIAGNOSIS — R07.9 CHEST PAIN: Primary | ICD-10-CM

## 2022-10-15 LAB
ALBUMIN SERPL BCP-MCNC: 3.7 G/DL (ref 3.5–5.2)
ALP SERPL-CCNC: 76 U/L (ref 55–135)
ALT SERPL W/O P-5'-P-CCNC: 17 U/L (ref 10–44)
AMPHET+METHAMPHET UR QL: NEGATIVE
ANION GAP SERPL CALC-SCNC: 8 MMOL/L (ref 8–16)
AST SERPL-CCNC: 21 U/L (ref 10–40)
B-HCG UR QL: NEGATIVE
BARBITURATES UR QL SCN>200 NG/ML: NEGATIVE
BASOPHILS # BLD AUTO: 0.05 K/UL (ref 0–0.2)
BASOPHILS NFR BLD: 0.5 % (ref 0–1.9)
BENZODIAZ UR QL SCN>200 NG/ML: NEGATIVE
BILIRUB SERPL-MCNC: 0.2 MG/DL (ref 0.1–1)
BNP SERPL-MCNC: 11 PG/ML (ref 0–99)
BUN SERPL-MCNC: 11 MG/DL (ref 6–20)
BZE UR QL SCN: NEGATIVE
CALCIUM SERPL-MCNC: 9.1 MG/DL (ref 8.7–10.5)
CANNABINOIDS UR QL SCN: ABNORMAL
CHLORIDE SERPL-SCNC: 104 MMOL/L (ref 95–110)
CO2 SERPL-SCNC: 24 MMOL/L (ref 23–29)
CREAT SERPL-MCNC: 0.8 MG/DL (ref 0.5–1.4)
CREAT UR-MCNC: 195.5 MG/DL (ref 15–325)
CTP QC/QA: YES
DIFFERENTIAL METHOD: ABNORMAL
EOSINOPHIL # BLD AUTO: 0.1 K/UL (ref 0–0.5)
EOSINOPHIL NFR BLD: 0.9 % (ref 0–8)
ERYTHROCYTE [DISTWIDTH] IN BLOOD BY AUTOMATED COUNT: 12.3 % (ref 11.5–14.5)
EST. GFR  (NO RACE VARIABLE): >60 ML/MIN/1.73 M^2
ETHANOL SERPL-MCNC: <10 MG/DL
GLUCOSE SERPL-MCNC: 93 MG/DL (ref 70–110)
HCT VFR BLD AUTO: 38.3 % (ref 37–48.5)
HGB BLD-MCNC: 13.2 G/DL (ref 12–16)
IMM GRANULOCYTES # BLD AUTO: 0.02 K/UL (ref 0–0.04)
IMM GRANULOCYTES NFR BLD AUTO: 0.2 % (ref 0–0.5)
LYMPHOCYTES # BLD AUTO: 3.1 K/UL (ref 1–4.8)
LYMPHOCYTES NFR BLD: 33.6 % (ref 18–48)
MCH RBC QN AUTO: 33.3 PG (ref 27–31)
MCHC RBC AUTO-ENTMCNC: 34.5 G/DL (ref 32–36)
MCV RBC AUTO: 97 FL (ref 82–98)
METHADONE UR QL SCN>300 NG/ML: NEGATIVE
MONOCYTES # BLD AUTO: 0.7 K/UL (ref 0.3–1)
MONOCYTES NFR BLD: 7.9 % (ref 4–15)
NEUTROPHILS # BLD AUTO: 5.2 K/UL (ref 1.8–7.7)
NEUTROPHILS NFR BLD: 56.9 % (ref 38–73)
NRBC BLD-RTO: 0 /100 WBC
OPIATES UR QL SCN: NEGATIVE
PCP UR QL SCN>25 NG/ML: NEGATIVE
PLATELET # BLD AUTO: 311 K/UL (ref 150–450)
PMV BLD AUTO: 8.8 FL (ref 9.2–12.9)
POC MOLECULAR INFLUENZA A AGN: NEGATIVE
POC MOLECULAR INFLUENZA B AGN: NEGATIVE
POTASSIUM SERPL-SCNC: 3.7 MMOL/L (ref 3.5–5.1)
PROT SERPL-MCNC: 7 G/DL (ref 6–8.4)
RBC # BLD AUTO: 3.96 M/UL (ref 4–5.4)
SARS-COV-2 RDRP RESP QL NAA+PROBE: NEGATIVE
SODIUM SERPL-SCNC: 136 MMOL/L (ref 136–145)
TOXICOLOGY INFORMATION: ABNORMAL
TROPONIN I SERPL DL<=0.01 NG/ML-MCNC: <0.006 NG/ML (ref 0–0.03)
TSH SERPL DL<=0.005 MIU/L-ACNC: 2.49 UIU/ML (ref 0.4–4)
WBC # BLD AUTO: 9.2 K/UL (ref 3.9–12.7)

## 2022-10-15 PROCEDURE — 96374 THER/PROPH/DIAG INJ IV PUSH: CPT

## 2022-10-15 PROCEDURE — 83880 ASSAY OF NATRIURETIC PEPTIDE: CPT | Performed by: EMERGENCY MEDICINE

## 2022-10-15 PROCEDURE — 94761 N-INVAS EAR/PLS OXIMETRY MLT: CPT

## 2022-10-15 PROCEDURE — 80053 COMPREHEN METABOLIC PANEL: CPT | Performed by: EMERGENCY MEDICINE

## 2022-10-15 PROCEDURE — 93010 EKG 12-LEAD: ICD-10-PCS | Mod: ,,, | Performed by: INTERNAL MEDICINE

## 2022-10-15 PROCEDURE — 87635 SARS-COV-2 COVID-19 AMP PRB: CPT | Performed by: EMERGENCY MEDICINE

## 2022-10-15 PROCEDURE — 63600175 PHARM REV CODE 636 W HCPCS: Performed by: EMERGENCY MEDICINE

## 2022-10-15 PROCEDURE — 85025 COMPLETE CBC W/AUTO DIFF WBC: CPT | Performed by: EMERGENCY MEDICINE

## 2022-10-15 PROCEDURE — 81025 URINE PREGNANCY TEST: CPT | Performed by: EMERGENCY MEDICINE

## 2022-10-15 PROCEDURE — 93005 ELECTROCARDIOGRAM TRACING: CPT

## 2022-10-15 PROCEDURE — 80307 DRUG TEST PRSMV CHEM ANLYZR: CPT | Performed by: EMERGENCY MEDICINE

## 2022-10-15 PROCEDURE — 25000003 PHARM REV CODE 250: Performed by: EMERGENCY MEDICINE

## 2022-10-15 PROCEDURE — 93010 ELECTROCARDIOGRAM REPORT: CPT | Mod: ,,, | Performed by: INTERNAL MEDICINE

## 2022-10-15 PROCEDURE — 94640 AIRWAY INHALATION TREATMENT: CPT

## 2022-10-15 PROCEDURE — 82077 ASSAY SPEC XCP UR&BREATH IA: CPT | Performed by: EMERGENCY MEDICINE

## 2022-10-15 PROCEDURE — 99285 EMERGENCY DEPT VISIT HI MDM: CPT | Mod: 25

## 2022-10-15 PROCEDURE — 84443 ASSAY THYROID STIM HORMONE: CPT | Performed by: EMERGENCY MEDICINE

## 2022-10-15 PROCEDURE — 84484 ASSAY OF TROPONIN QUANT: CPT | Performed by: EMERGENCY MEDICINE

## 2022-10-15 PROCEDURE — 25000242 PHARM REV CODE 250 ALT 637 W/ HCPCS: Performed by: EMERGENCY MEDICINE

## 2022-10-15 RX ORDER — IPRATROPIUM BROMIDE AND ALBUTEROL SULFATE 2.5; .5 MG/3ML; MG/3ML
3 SOLUTION RESPIRATORY (INHALATION)
Status: COMPLETED | OUTPATIENT
Start: 2022-10-15 | End: 2022-10-15

## 2022-10-15 RX ORDER — ASPIRIN 325 MG
325 TABLET ORAL
Status: COMPLETED | OUTPATIENT
Start: 2022-10-15 | End: 2022-10-15

## 2022-10-15 RX ORDER — KETOROLAC TROMETHAMINE 30 MG/ML
30 INJECTION, SOLUTION INTRAMUSCULAR; INTRAVENOUS
Status: COMPLETED | OUTPATIENT
Start: 2022-10-15 | End: 2022-10-15

## 2022-10-15 RX ORDER — KETOROLAC TROMETHAMINE 10 MG/1
10 TABLET, FILM COATED ORAL EVERY 6 HOURS PRN
Qty: 20 TABLET | Refills: 0 | Status: SHIPPED | OUTPATIENT
Start: 2022-10-15

## 2022-10-15 RX ORDER — ALBUTEROL SULFATE 90 UG/1
2 AEROSOL, METERED RESPIRATORY (INHALATION) EVERY 4 HOURS PRN
Qty: 18 G | Refills: 2 | Status: SHIPPED | OUTPATIENT
Start: 2022-10-15 | End: 2023-10-15

## 2022-10-15 RX ADMIN — KETOROLAC TROMETHAMINE 30 MG: 30 INJECTION, SOLUTION INTRAMUSCULAR; INTRAVENOUS at 06:10

## 2022-10-15 RX ADMIN — ASPIRIN 325 MG ORAL TABLET 325 MG: 325 PILL ORAL at 04:10

## 2022-10-15 RX ADMIN — IPRATROPIUM BROMIDE AND ALBUTEROL SULFATE 3 ML: 2.5; .5 SOLUTION RESPIRATORY (INHALATION) at 06:10

## 2022-10-15 NOTE — ED PROVIDER NOTES
Encounter Date: 10/15/2022       History     Chief Complaint   Patient presents with    Chest Pain     Pt has been having chest pain radiating to her back for the past 2 days - pain gets worse when she takes a deep breath or is palpated      37 yo female presents via personal transportation with 2 days of atrauamtic severe (9/10) substernal constant chest pain which is worse with touch to sternum and slightly worse with breathing.  Patient feels a little short of breath.  No cough.  No diaphoresis.  No nausea.  No lower extremity swelling.  Patient took Ibuprofen 800mg early in the day yesterday Friday 10/14/22 without relief and Tylenol 1g around 9:30pm without relief.  Patient has been vaping for 3-4 months and has smoked cigarettes for over 20 years.  Patient went to ER at [facility] initially tonight.      Patient's PCP recently started her on BP medication.  No other regular meds.  No hormonal meds.  No family history of blood clot or heart disease.      Patient's significant other MRN [id number] is also here in ER.      Review of patient's allergies indicates:  No Known Allergies  History reviewed. No pertinent past medical history.  History reviewed. No pertinent surgical history.  Family History   Problem Relation Age of Onset    Hypertension Sister      Social History     Tobacco Use    Smoking status: Every Day     Packs/day: 0.50     Types: Cigarettes    Smokeless tobacco: Never   Substance Use Topics    Alcohol use: No    Drug use: Yes     Types: Marijuana     Review of Systems   Constitutional:  Negative for fever.   HENT:  Negative for sore throat.    Eyes:  Negative for photophobia.   Respiratory:  Positive for shortness of breath. Negative for cough.    Cardiovascular:  Positive for chest pain. Negative for leg swelling.   Gastrointestinal:  Negative for abdominal pain, nausea and vomiting.   Genitourinary:  Negative for dysuria.   Musculoskeletal:  Negative for gait problem.   Skin:  Negative for rash.    Neurological:  Negative for syncope.     Physical Exam     Initial Vitals [10/15/22 0437]   BP Pulse Resp Temp SpO2   (!) 143/84 74 16 98.2 °F (36.8 °C) 100 %      MAP       --         Physical Exam    Nursing note and vitals reviewed.  Constitutional: She appears well-developed and well-nourished. She is not diaphoretic.   Awake, alert, nontoxic.   HENT:   Head: Normocephalic and atraumatic.   Mouth/Throat: Oropharynx is clear and moist.   Eyes: Conjunctivae and EOM are normal. Pupils are equal, round, and reactive to light.   Neck: Neck supple.   Normal range of motion.  Cardiovascular:  Normal rate, regular rhythm, normal heart sounds and intact distal pulses.           No murmur heard.  Pulmonary/Chest: No respiratory distress. She has wheezes (rare). She has no rhonchi. She has no rales. She exhibits tenderness (midsternal).   Abdominal: Abdomen is soft. There is no abdominal tenderness.   Musculoskeletal:         General: No tenderness or edema. Normal range of motion.      Cervical back: Normal range of motion and neck supple.     Neurological: She is alert and oriented to person, place, and time. She has normal strength.   Moving all extremities.   Skin: Skin is warm and dry. No erythema. No pallor.   Psychiatric: She has a normal mood and affect.       ED Course   Procedures  Labs Reviewed   CBC W/ AUTO DIFFERENTIAL - Abnormal; Notable for the following components:       Result Value    RBC 3.96 (*)     MCH 33.3 (*)     MPV 8.8 (*)     All other components within normal limits   COMPREHENSIVE METABOLIC PANEL   B-TYPE NATRIURETIC PEPTIDE   TROPONIN I   TSH   ALCOHOL,MEDICAL (ETHANOL)   DRUG SCREEN PANEL, URINE EMERGENCY   POCT URINE PREGNANCY   SARS-COV-2 RDRP GENE   POCT INFLUENZA A/B MOLECULAR     EKG Readings: (Independently Interpreted)   05:03: NSR, HR 69. Sinus arrhythmia. Normal axis. No ectopy. No STEMI.      Imaging Results              X-Ray Chest AP Portable (Final result)  Result time 10/15/22  06:08:31      Final result by Abdiaziz Siddiqi MD (10/15/22 06:08:31)                   Impression:      There is no radiographic evidence for acute intrathoracic process.      Electronically signed by: Abdiaziz Siddiqi  Date:    10/15/2022  Time:    06:08               Narrative:    EXAMINATION:  XR CHEST AP PORTABLE    CLINICAL HISTORY:  Chest pain, unspecified    TECHNIQUE:  Single frontal view of the chest was performed.    COMPARISON:  Chest radiograph May 19, 2017    FINDINGS:  Single portable chest view is submitted.  The cardiomediastinal silhouette appears stable.  Mild accentuation at the lung bases is most consistent with overlying soft tissue attenuation.  There is no evidence for superimposed confluent infiltrate or consolidation, significant pleural effusion or pneumothorax.  The visualized osseous structures appear intact.                                    X-Rays:   Independently Interpreted Readings:   Other Readings:  CXR NAD  Medications   aspirin tablet 325 mg (325 mg Oral Given 10/15/22 4855)   albuterol-ipratropium 2.5 mg-0.5 mg/3 mL nebulizer solution 3 mL (3 mLs Nebulization Given 10/15/22 0602)   ketorolac injection 30 mg (30 mg Intravenous Given 10/15/22 0633)     Medical Decision Making:   History:   Old Medical Records: I decided to obtain old medical records.  Old Records Summarized: records from previous admission(s).  Initial Assessment:   36 y.o. female with chest pain.  Differential Diagnosis:   Differential includes ACS, CHF, PE, arrhythmia, PTX, aortic dissection, PNA, musculoskeletal CP, GERD, anxiety, other.   Independently Interpreted Test(s):   I have ordered and independently interpreted X-rays - see prior notes.  I have ordered and independently interpreted EKG Reading(s) - see prior notes  Clinical Tests:   Lab Tests: Reviewed and Ordered  Radiological Study: Ordered and Reviewed  Medical Tests: Ordered and Reviewed  ED Management:  EKG no STEMI.    CXR NAD.    Labs  reassuring.    PERC negative, Well's 0. Low suspicion for PE.    The patient received p.o. aspirin 325 mg, IV Toradol 30 mg, and DuoNeb.  She felt better after ED treatment.    I suspect noncardiac chest pain, chest wall pain versus COPD / tobacco-related.  I have advised smoking cessation and have rx'ed albuterol and toradol PRN.                             Clinical Impression:   Final diagnoses:  [R07.9] Chest pain (Primary)  [F17.200] Tobacco dependence  [R07.89] Chest wall pain        ED Disposition Condition    Discharge Stable          ED Prescriptions       Medication Sig Dispense Start Date End Date Auth. Provider    ketorolac (TORADOL) 10 mg tablet Take 1 tablet (10 mg total) by mouth every 6 (six) hours as needed for Pain. 20 tablet 10/15/2022 -- Deena Ba MD    albuterol (PROVENTIL/VENTOLIN HFA) 90 mcg/actuation inhaler Inhale 2 puffs into the lungs every 4 (four) hours as needed for Wheezing or Shortness of Breath. Rescue 18 g 10/15/2022 10/15/2023 Deena Ba MD          Follow-up Information       Follow up With Specialties Details Why Contact Info    your primary care physician                 Deena Ba MD  10/15/22 5582

## 2022-10-15 NOTE — ED TRIAGE NOTES
Pt presents to the ED c/o chest pain. Pt reports midsternal chest pain that feels like a pressure in her chest for the past x2 days. Reports the pain is worse when lifting things or takes a deep breath. Reports taking ibuprofen and tylenol with minimal to no relief from the pain. Denies any other complaints at this time. AAOx4

## 2022-10-17 ENCOUNTER — HOSPITAL ENCOUNTER (EMERGENCY)
Facility: OTHER | Age: 37
Discharge: HOME | End: 2022-10-17
Attending: EMERGENCY MEDICINE
Payer: MEDICARE

## 2022-10-17 VITALS
SYSTOLIC BLOOD PRESSURE: 133 MMHG | HEART RATE: 69 BPM | HEIGHT: 65 IN | RESPIRATION RATE: 18 BRPM | BODY MASS INDEX: 29.82 KG/M2 | TEMPERATURE: 98 F | DIASTOLIC BLOOD PRESSURE: 85 MMHG | WEIGHT: 179 LBS | OXYGEN SATURATION: 98 %

## 2022-10-17 DIAGNOSIS — R07.9 CHEST PAIN: ICD-10-CM

## 2022-10-17 DIAGNOSIS — M94.0 COSTOCHONDRITIS: Primary | ICD-10-CM

## 2022-10-17 PROCEDURE — 93005 ELECTROCARDIOGRAM TRACING: CPT

## 2022-10-17 PROCEDURE — 93010 ELECTROCARDIOGRAM REPORT: CPT | Mod: ,,, | Performed by: INTERNAL MEDICINE

## 2022-10-17 PROCEDURE — 99284 EMERGENCY DEPT VISIT MOD MDM: CPT | Mod: 25

## 2022-10-17 PROCEDURE — 93010 EKG 12-LEAD: ICD-10-PCS | Mod: ,,, | Performed by: INTERNAL MEDICINE

## 2022-10-17 PROCEDURE — 25000003 PHARM REV CODE 250: Performed by: EMERGENCY MEDICINE

## 2022-10-17 RX ORDER — CYCLOBENZAPRINE HCL 10 MG
10 TABLET ORAL
Status: COMPLETED | OUTPATIENT
Start: 2022-10-17 | End: 2022-10-17

## 2022-10-17 RX ORDER — KETOROLAC TROMETHAMINE 10 MG/1
10 TABLET, FILM COATED ORAL
Status: COMPLETED | OUTPATIENT
Start: 2022-10-17 | End: 2022-10-17

## 2022-10-17 RX ORDER — CYCLOBENZAPRINE HCL 5 MG
5 TABLET ORAL 3 TIMES DAILY PRN
Qty: 20 TABLET | Refills: 0 | Status: SHIPPED | OUTPATIENT
Start: 2022-10-17 | End: 2022-10-22

## 2022-10-17 RX ORDER — IBUPROFEN 800 MG/1
800 TABLET ORAL EVERY 6 HOURS PRN
Qty: 30 TABLET | Refills: 0 | Status: SHIPPED | OUTPATIENT
Start: 2022-10-17

## 2022-10-17 RX ADMIN — CYCLOBENZAPRINE 10 MG: 10 TABLET, FILM COATED ORAL at 02:10

## 2022-10-17 RX ADMIN — KETOROLAC TROMETHAMINE 10 MG: 10 TABLET, FILM COATED ORAL at 02:10

## 2022-10-17 NOTE — ED NOTES
Pt complains of mid sternal chest pain radiating into back. Pt denies any other symptoms at this time and states it is the same that she was just seen for but it is getting worse    LOC: Pt is awake alert and aware of environment, oriented X3 and speaking appropriately  Appearance: Pt is in no acute distress, Pt is well groomed and clean  Skin: skin is warm and dry with normal turgor, mucus membranes are moist and pink, skin is intact with no bruising or breakdown  Muskuloskeletal: Pt moves all extremities well, there is no obvious swelling or deformities noted, pulses are intact.  Respiratory: Airway is open and patent, respirations are spontaneous and even.  Cardiac: no edema and cap refill is <3sec  Neuro: Pt follows commands easily and has no obvious deficits

## 2022-10-17 NOTE — ED PROVIDER NOTES
"Encounter Date: 10/17/2022    SCRIBE #1 NOTE: I, Josr Santillan, marcelina scribing for, and in the presence of,  Luis M Watts II, MD. I have scribed the following portions of the note - Other sections scribed: HPI, ROS, PE.     History     Chief Complaint   Patient presents with    Chest Pain     X about a week "getting worse and worse to where I can't sleep"     Time seen by provider: 2:32 AM    This is a 36 y.o. female who presents with complaint of worsening sternal chest pain. Her pain was initially intermittent when it began one week ago, and has become constant over the past several days. She describes the pain as if someone is stepping on her, and worsens with movement and when taking a deep breath. Patient was seen here in the ED two days ago and was discharged on an inhaler and an anti-inflammatory medication. Her insurance would not cover the latter, and the inhaler does not provide any relief. Patient is also taking ibuprofen 800 mg twice daily without relief, and her last dose was taken nine hours ago. She denies any fevers or cough. No PMHx such as DM or HTN.    The history is provided by the patient.   Review of patient's allergies indicates:  No Known Allergies  No past medical history on file.  No past surgical history on file.  Family History   Problem Relation Age of Onset    Hypertension Sister      Social History     Tobacco Use    Smoking status: Every Day     Packs/day: 0.50     Types: Cigarettes    Smokeless tobacco: Never   Substance Use Topics    Alcohol use: No    Drug use: Yes     Types: Marijuana     Review of Systems   Constitutional:  Negative for fever.   HENT:  Negative for sore throat.    Respiratory:  Negative for cough and shortness of breath.    Cardiovascular:  Positive for chest pain.   Gastrointestinal:  Negative for nausea.   Genitourinary:  Negative for dysuria.   Musculoskeletal:  Negative for back pain.   Skin:  Negative for rash.   Neurological:  Negative for weakness. "   Hematological:  Does not bruise/bleed easily.     Physical Exam     Initial Vitals [10/17/22 0203]   BP Pulse Resp Temp SpO2   137/81 80 16 97.8 °F (36.6 °C) 100 %      MAP       --         Physical Exam    Nursing note and vitals reviewed.  Constitutional: She appears well-developed and well-nourished. No distress.   Anxious and uncomfortable appearing   HENT:   Head: Normocephalic and atraumatic.   Eyes: Conjunctivae are normal.   Neck: Neck supple.   Cardiovascular:  Normal rate, regular rhythm and normal heart sounds.     Exam reveals no gallop.       No murmur heard.  Pulmonary/Chest: Breath sounds normal. No respiratory distress. She has no wheezes. She has no rales. She exhibits tenderness.   Reproducible tenderness along sternal border with palpation and deep inspiration.   Musculoskeletal:         General: No edema.      Cervical back: Neck supple.     Neurological: She is alert and oriented to person, place, and time.   Skin: Skin is warm and dry.   Psychiatric: She has a normal mood and affect.       ED Course   Procedures  Labs Reviewed - No data to display  EKG Readings: (Independently Interpreted)   Sinus rhythm. Rate of 75. Normal MS and QT intervals. No ST or T wave changes. No ischemia or arrhythmia. No pericarditis.      Imaging Results    None          Medications   ketorolac tablet 10 mg (10 mg Oral Given 10/17/22 0248)   cyclobenzaprine tablet 10 mg (10 mg Oral Given 10/17/22 0248)     Medical Decision Making:   History:   Old Medical Records: I decided to obtain old medical records.  Independently Interpreted Test(s):   I have ordered and independently interpreted EKG Reading(s) - see prior notes  Clinical Tests:   Medical Tests: Ordered and Reviewed   Patient presents complaining of ongoing chest pain.  She points to the central chest as location.  It is become more constant when seen previously.  It is exacerbated by taking deep breath and movement of the torso as well as by palpation in  the region of the costochondral junctions.  At her recent evaluation she had extensive workup including chest x-ray, BNP, troponin.  Today's EKG does not show any interval change or signs of ischemia pericarditis.  I think the clinical picture suggest costochondritis and I do not think further workup needs to be performed.  She was unable to get Toradol filled due to insurance coverage I therefore will start her on 800 mg of ibuprofen, Flexeril.  Encouraged follow-up with primary care, especially if symptoms persist.  Return precautions discussed for the interim.     Scribe Attestation:   Scribe #1: I performed the above scribed service and the documentation accurately describes the services I performed. I attest to the accuracy of the note.            Physician Attestation for Scribe: I, TL, reviewed documentation as scribed in my presence, which is both accurate and complete.         Clinical Impression:   Final diagnoses:  [R07.9] Chest pain  [M94.0] Costochondritis (Primary)      ED Disposition Condition    Discharge Stable          ED Prescriptions       Medication Sig Dispense Start Date End Date Auth. Provider    ibuprofen (ADVIL,MOTRIN) 800 MG tablet Take 1 tablet (800 mg total) by mouth every 6 (six) hours as needed for Pain. 30 tablet 10/17/2022 -- Luis M Watts II, MD    cyclobenzaprine (FLEXERIL) 5 MG tablet Take 1 tablet (5 mg total) by mouth 3 (three) times daily as needed for Muscle spasms. 20 tablet 10/17/2022 10/22/2022 Luis M Watts II, MD          Follow-up Information       Follow up With Specialties Details Why Contact Info    [facility]  In 5 days  [address] [facility] [address]  [phone]               Luis M Watts II, MD  10/17/22 0667

## 2024-08-10 ENCOUNTER — HOSPITAL ENCOUNTER (EMERGENCY)
Facility: HOSPITAL | Age: 39
Discharge: HOME | End: 2024-08-10
Attending: EMERGENCY MEDICINE
Payer: MEDICARE

## 2024-08-10 VITALS
BODY MASS INDEX: 25.83 KG/M2 | SYSTOLIC BLOOD PRESSURE: 127 MMHG | DIASTOLIC BLOOD PRESSURE: 94 MMHG | RESPIRATION RATE: 17 BRPM | HEIGHT: 65 IN | OXYGEN SATURATION: 100 % | WEIGHT: 155 LBS | TEMPERATURE: 99 F | HEART RATE: 68 BPM

## 2024-08-10 DIAGNOSIS — E27.8 ADRENAL NODULE: ICD-10-CM

## 2024-08-10 DIAGNOSIS — R10.9 FLANK PAIN: Primary | ICD-10-CM

## 2024-08-10 DIAGNOSIS — N83.202 CYST OF LEFT OVARY: ICD-10-CM

## 2024-08-10 LAB
ALBUMIN SERPL-MCNC: 3.9 G/DL (ref 3.3–5.5)
ALP SERPL-CCNC: 64 U/L (ref 42–141)
B-HCG UR QL: NEGATIVE
BILIRUB SERPL-MCNC: 0.9 MG/DL (ref 0.2–1.6)
BILIRUBIN, POC UA: NEGATIVE
BLOOD, POC UA: NEGATIVE
BUN SERPL-MCNC: 7 MG/DL (ref 7–22)
CALCIUM SERPL-MCNC: 9.8 MG/DL (ref 8–10.3)
CHLORIDE SERPL-SCNC: 107 MMOL/L (ref 98–108)
CLARITY, UA POC: CLEAR
COLOR, UA POC: YELLOW
CREAT SERPL-MCNC: 0.7 MG/DL (ref 0.6–1.2)
CTP QC/QA: YES
GLUCOSE SERPL-MCNC: 90 MG/DL (ref 73–118)
GLUCOSE, POC UA: NEGATIVE
HCT, POC: NORMAL
HGB, POC: NORMAL (ref 14–18)
KETONES, POC UA: NEGATIVE
LEUKOCYTE EST, POC UA: NEGATIVE
MCH, POC: NORMAL
MCHC, POC: NORMAL
MCV, POC: NORMAL
MPV, POC: NORMAL
NITRITE, POC UA: NEGATIVE
PH UR STRIP: 7 [PH]
POC ALT (SGPT): 11 U/L (ref 10–47)
POC AST (SGOT): 20 U/L (ref 11–38)
POC PLATELET COUNT: NORMAL
POC TCO2: 27 MMOL/L (ref 18–33)
POTASSIUM BLD-SCNC: 3.9 MMOL/L (ref 3.6–5.1)
PROTEIN, POC UA: NEGATIVE
PROTEIN, POC: 7.7 G/DL (ref 6.4–8.1)
RBC, POC: NORMAL
RDW, POC: NORMAL
SODIUM BLD-SCNC: 140 MMOL/L (ref 128–145)
SPECIFIC GRAVITY, POC UA: 1.02
UROBILINOGEN, POC UA: 0.2 E.U./DL
WBC, POC: NORMAL

## 2024-08-10 PROCEDURE — 63600175 PHARM REV CODE 636 W HCPCS: Mod: ER

## 2024-08-10 PROCEDURE — 81025 URINE PREGNANCY TEST: CPT | Mod: ER

## 2024-08-10 PROCEDURE — 96361 HYDRATE IV INFUSION ADD-ON: CPT | Mod: ER

## 2024-08-10 PROCEDURE — 85025 COMPLETE CBC W/AUTO DIFF WBC: CPT | Mod: ER

## 2024-08-10 PROCEDURE — 25000003 PHARM REV CODE 250: Mod: ER

## 2024-08-10 PROCEDURE — 96374 THER/PROPH/DIAG INJ IV PUSH: CPT | Mod: ER

## 2024-08-10 PROCEDURE — 80053 COMPREHEN METABOLIC PANEL: CPT | Mod: ER

## 2024-08-10 PROCEDURE — 99285 EMERGENCY DEPT VISIT HI MDM: CPT | Mod: 25,ER

## 2024-08-10 RX ORDER — CYCLOBENZAPRINE HCL 5 MG
5 TABLET ORAL 3 TIMES DAILY PRN
Qty: 20 TABLET | Refills: 0 | Status: SHIPPED | OUTPATIENT
Start: 2024-08-10 | End: 2024-08-20

## 2024-08-10 RX ORDER — ONDANSETRON 4 MG/1
4 TABLET, ORALLY DISINTEGRATING ORAL EVERY 6 HOURS PRN
Qty: 20 TABLET | Refills: 0 | Status: SHIPPED | OUTPATIENT
Start: 2024-08-10

## 2024-08-10 RX ORDER — KETOROLAC TROMETHAMINE 30 MG/ML
15 INJECTION, SOLUTION INTRAMUSCULAR; INTRAVENOUS
Status: COMPLETED | OUTPATIENT
Start: 2024-08-10 | End: 2024-08-10

## 2024-08-10 RX ORDER — NAPROXEN 500 MG/1
500 TABLET ORAL 2 TIMES DAILY
Qty: 20 TABLET | Refills: 0 | Status: SHIPPED | OUTPATIENT
Start: 2024-08-10

## 2024-08-10 RX ADMIN — KETOROLAC TROMETHAMINE 15 MG: 30 INJECTION, SOLUTION INTRAMUSCULAR at 01:08

## 2024-08-10 RX ADMIN — SODIUM CHLORIDE 1000 ML: 9 INJECTION, SOLUTION INTRAVENOUS at 01:08

## 2024-08-10 NOTE — Clinical Note
"Rad Simms" Ender was seen and treated in our emergency department on 8/10/2024.  She may return to work on 08/11/2024.       If you have any questions or concerns, please don't hesitate to call.      Iqra Mcgowan PA-C"

## 2024-08-10 NOTE — DISCHARGE INSTRUCTIONS

## 2024-08-10 NOTE — ED PROVIDER NOTES
Encounter Date: 8/10/2024       History     Chief Complaint   Patient presents with    Back Pain    Abdominal Pain     Lower back pain, abd pain, x 2 days, denies n/v/discharge or urinary issues     38-year-old female with no past medical history presents to ED for 2 day history bilateral flank pain that radiates to her lower abdomen (R>L).  She reports a cramping sensation that is constant.  She denies any attempted treatment.  Her last menstrual period was 08/02/2024.  Her last bowel movement was earlier today, which she states was normal.  she is still passing flatulence.  She denies any fever, chills, chest pain, shortness of breath, nausea, vomiting, diarrhea, dysuria, hematuria, vaginal bleeding, vaginal discharge.  No other symptoms reported.    The history is provided by the patient. No  was used.     Review of patient's allergies indicates:  No Known Allergies  History reviewed. No pertinent past medical history.  History reviewed. No pertinent surgical history.  Family History   Problem Relation Name Age of Onset    Hypertension Sister       Social History     Tobacco Use    Smoking status: Every Day     Current packs/day: 0.50     Types: Cigarettes    Smokeless tobacco: Never   Substance Use Topics    Alcohol use: No    Drug use: Yes     Types: Marijuana     Review of Systems   Constitutional:  Negative for chills and fever.   HENT:  Negative for congestion, ear pain, rhinorrhea and sore throat.    Eyes:  Negative for redness.   Respiratory:  Negative for cough and shortness of breath.    Cardiovascular:  Negative for chest pain.   Gastrointestinal:  Positive for abdominal pain. Negative for diarrhea, nausea and vomiting.   Genitourinary:  Positive for flank pain. Negative for decreased urine volume, difficulty urinating, dysuria, frequency, hematuria and urgency.   Musculoskeletal:  Negative for back pain and neck pain.   Skin:  Negative for rash.   Neurological:  Negative for  headaches.   Psychiatric/Behavioral:  Negative for confusion.        Physical Exam     Initial Vitals [08/10/24 1243]   BP Pulse Resp Temp SpO2   128/80 88 17 98.6 °F (37 °C) 96 %      MAP       --         Physical Exam    Nursing note and vitals reviewed.  Constitutional: She appears well-developed and well-nourished.  Non-toxic appearance. She does not appear ill.   HENT:   Head: Normocephalic and atraumatic.   Right Ear: Hearing, tympanic membrane, external ear and ear canal normal. Tympanic membrane is not perforated, not erythematous and not bulging.   Left Ear: Hearing, tympanic membrane, external ear and ear canal normal. Tympanic membrane is not perforated, not erythematous and not bulging.   Nose: Nose normal.   Mouth/Throat: Uvula is midline, oropharynx is clear and moist and mucous membranes are normal.   Eyes: Conjunctivae and EOM are normal.   Neck: Neck supple.   Normal range of motion.   Full passive range of motion without pain.     Cardiovascular:  Normal rate and regular rhythm.           Pulses:       Radial pulses are 2+ on the right side and 2+ on the left side.   Pulmonary/Chest: Effort normal and breath sounds normal. No accessory muscle usage. No respiratory distress. She has no decreased breath sounds.   Abdominal: Abdomen is soft. Bowel sounds are normal. She exhibits no distension. There is no abdominal tenderness.   No right CVA tenderness.  No left CVA tenderness. There is no rebound and no guarding.   Musculoskeletal:         General: Normal range of motion.      Cervical back: Full passive range of motion without pain, normal range of motion and neck supple. No rigidity.     Neurological: She is alert. No cranial nerve deficit.   Neuro intact.  Strength and sensation intact bilateral upper and lower extremities.   Skin: Skin is warm and dry.   Psychiatric: She has a normal mood and affect.         ED Course   Procedures  Labs Reviewed   POCT URINE PREGNANCY       Result Value    POC  Preg Test, Ur Negative       Acceptable Yes     POCT CBC    Hematocrit        Hemoglobin        RBC        WBC        MCV        MCH, POC        MCHC        RDW-CV        Platelet Count, POC        MPV       POCT URINALYSIS W/O SCOPE    Glucose, UA Negative      Bilirubin, UA Negative      Ketones, UA Negative      Spec Grav UA 1.025      Blood, UA Negative      PH, UA 7.0      Protein, UA Negative      Urobilinogen, UA 0.2      Nitrite, UA Negative      Leukocytes, UA Negative      Color, UA POC Yellow      Clarity, UA, POC Clear     POCT URINALYSIS W/O SCOPE   POCT CMP   POCT CMP    Albumin, POC 3.9      Alkaline Phosphatase, POC 64      ALT (SGPT), POC 11      AST (SGOT), POC 20      POC BUN 7      Calcium, POC 9.8      POC Chloride 107      POC Creatinine 0.7      POC Glucose 90      POC Potassium 3.9      POC Sodium 140      Bilirubin, POC 0.9      POC TCO2 27      Protein, POC 7.7            Imaging Results              CT Renal Stone Study ABD Pelvis WO (Final result)  Result time 08/10/24 13:36:19      Final result by Kirby Ley MD (08/10/24 13:36:19)                   Impression:      1. No acute process or CT findings identified to explain patient's symptoms of flank pain on this noncontrast study.  Specifically, no radiodense calculus within the urinary tract or obstructive uropathy.  2. Left adrenal 3.8 cm indeterminate nodule, previously subcentimeter in 2016.  Further characterization with elective/nonemergent CT or MRI of the abdomen with adrenal mass protocol on an outpatient basis can be obtained as warranted.  3. Left adnexal 4.4 cm suspected ovarian cyst.  Further evaluation versus short-term follow-up in 6-8 weeks with pelvic ultrasound can be obtained as warranted.      Electronically signed by: Kirby Ley MD  Date:    08/10/2024  Time:    13:36               Narrative:    EXAMINATION:  CT RENAL STONE STUDY ABD PELVIS WO    CLINICAL HISTORY:  Flank pain, kidney stone  suspected;    TECHNIQUE:  Low dose axial images, sagittal and coronal reformations were obtained from the lung bases to the pubic symphysis.  Contrast was not administered.    COMPARISON:  Chest radiograph 10/15/2022, CT abdomen and pelvis 07/01/2016    FINDINGS:  Lack of IV contrast limits evaluation of soft tissue and vascular structures.    Imaged lung bases are clear.  Base of the heart is within normal limits.    Noncontrast appearance of the liver, gallbladder, pancreas, spleen, stomach, duodenum and right adrenal gland are within normal limits.  No significant biliary ductal dilatation.  There is a 3.8 cm homogeneous nodule at the left adrenal gland with average 21 Hounsfield units, previously subcentimeter on 07/01/2016, not consistent with a lipid rich adenoma and remains incompletely characterized.    Bilateral kidneys are normal in size, shape and location.  No radiopaque calculus seen within the urinary tract.  No hydronephrosis or significant perinephric stranding.  Ureters are normal in course and caliber.  Urinary bladder is suboptimally distended.    4.4 cm fluid density structure at the left adnexa suggestive of a cyst.  No right adnexal mass.  Trace volume nonspecific free pelvic fluid, commonly physiologic in this age group.  Noncontrast appearance of the uterus is within normal limits.  Pelvic phleboliths noted.    Appendix and terminal ileum are normal in morphology noting appendix filled with hyperdense material, but no adjacent inflammatory change.  No evidence of bowel obstruction or acute bowel inflammation.  No pneumatosis or portal venous gas.    No ascites, free air or lymphadenopathy by CT criteria.  No significant calcific atherosclerosis.  Aorta tapers normally.    Extraperitoneal soft tissues and osseous structures are without significant abnormality.                                       Medications   sodium chloride 0.9% bolus 1,000 mL 1,000 mL (1,000 mLs Intravenous New Bag 8/10/24  1311)   ketorolac injection 15 mg (15 mg Intravenous Given 8/10/24 1311)     Medical Decision Making  This is a 38-year-old female with no past medical history presents to ED for 2 day history bilateral flank pain that radiates to her lower abdomen (R>L).  She reports a cramping sensation that is constant.  She denies any attempted treatment.  Her last menstrual period was 08/02/2024.  Her last bowel movement was earlier today, which she states was normal.  she is still passing flatulence.  She denies any fever, chills, chest pain, shortness of breath, nausea, vomiting, diarrhea, dysuria, hematuria, vaginal bleeding, vaginal discharge.  No other symptoms reported.    On physical exam, patient is well-appearing and in no acute distress.  Nontoxic appearing.  Lungs are clear to auscultation bilaterally.  Abdomen is soft and nontender.  No guarding, rigidity, rebound.  2+ radial pulses bilaterally.  Posterior oropharynx is not erythematous.  No edema or exudate.  Uvula midline.  Bilateral tympanic membrane is normal.  No erythema, bulging, or perforations.  Neuro intact.  Strength and sensation intact bilateral upper and lower extremities. No CVA tenderness. Abdomen is soft and nontender.  UPT negative.  UA unremarkable.  Doubt cystitis.  Fluids, Toradol ordered.  Will reassess.  CBC revealed mild leukocytosis of 10.7.  No signs of anemia.  CMP unremarkable.  Doubt electrolyte abnormality.  Normal renal function.  CT revealed: 1. No acute process or CT findings identified to explain patient's symptoms of flank pain on this noncontrast study.  Specifically, no radiodense calculus within the urinary tract or obstructive uropathy.   2. Left adrenal 3.8 cm indeterminate nodule, previously subcentimeter in 2016.  Further characterization with elective/nonemergent CT or MRI of the abdomen with adrenal mass protocol on an outpatient basis can be obtained as warranted.   3. Left adnexal 4.4 cm suspected ovarian cyst.  Further  evaluation versus short-term follow-up in 6-8 weeks with pelvic ultrasound can be obtained as warranted.     Upon reassessment, patient reports relief to her pain.  Will discharge patient on naproxen, Flexeril, and Zofran.  Advised patient to follow-up with primary care for further evaluation.    Strict return precautions given. I discussed with the patient/family the diagnosis, treatment plan, indications for return to the emergency department, and for expected follow-up. The patient/family verbalized an understanding. The patient/family is asked if there are any questions or concerns. We discuss the case, until all issues are addressed to the patient/family's satisfaction. Patient/family understands and is agreeable to the plan. Patient is stable and ready for discharge.      Amount and/or Complexity of Data Reviewed  Labs: ordered.  Radiology: ordered.    Risk  Prescription drug management.                                      Clinical Impression:  Final diagnoses:  [R10.9] Flank pain (Primary)  [E27.8] Adrenal nodule  [N83.202] Cyst of left ovary          ED Disposition Condition    Discharge Stable          ED Prescriptions       Medication Sig Dispense Start Date End Date Auth. Provider    naproxen (NAPROSYN) 500 MG tablet Take 1 tablet (500 mg total) by mouth 2 (two) times daily. 20 tablet 8/10/2024 -- Iqra Mcgowan PA-C    cyclobenzaprine (FLEXERIL) 5 MG tablet Take 1 tablet (5 mg total) by mouth 3 (three) times daily as needed for Muscle spasms. 20 tablet 8/10/2024 8/20/2024 Iqra Mcgowan PA-C    ondansetron (ZOFRAN-ODT) 4 MG TbDL Take 1 tablet (4 mg total) by mouth every 6 (six) hours as needed (nausea). 20 tablet 8/10/2024 -- Iqra Mcgowan PA-C          Follow-up Information       Follow up With Specialties Details Why Contact [facility] -  Schedule an appointment as soon as possible for a visit in 2 days for further evaluation [address] [place]  [address]  [phone]      [facility] ED Emergency Medicine In 2 days If symptoms worsen [address] [facility] [address] [phone]             Iqra Mcgowan PA-C  08/10/24 1401       Iqra Mcgowan PA-C  08/10/24 1401

## 2024-12-20 ENCOUNTER — HOSPITAL ENCOUNTER (EMERGENCY)
Facility: OTHER | Age: 39
Discharge: HOME | End: 2024-12-20
Attending: EMERGENCY MEDICINE
Payer: COMMERCIAL

## 2024-12-20 VITALS
HEIGHT: 65 IN | RESPIRATION RATE: 20 BRPM | OXYGEN SATURATION: 100 % | BODY MASS INDEX: 23.32 KG/M2 | SYSTOLIC BLOOD PRESSURE: 139 MMHG | HEART RATE: 87 BPM | DIASTOLIC BLOOD PRESSURE: 90 MMHG | TEMPERATURE: 98 F | WEIGHT: 140 LBS

## 2024-12-20 DIAGNOSIS — J10.1 INFLUENZA A: Primary | ICD-10-CM

## 2024-12-20 LAB
CTP QC/QA: YES
CTP QC/QA: YES
POC MOLECULAR INFLUENZA A AGN: POSITIVE
POC MOLECULAR INFLUENZA B AGN: NEGATIVE
SARS-COV-2 RDRP RESP QL NAA+PROBE: NEGATIVE

## 2024-12-20 PROCEDURE — 99283 EMERGENCY DEPT VISIT LOW MDM: CPT

## 2024-12-20 PROCEDURE — 87635 SARS-COV-2 COVID-19 AMP PRB: CPT | Performed by: EMERGENCY MEDICINE

## 2024-12-20 PROCEDURE — 25000003 PHARM REV CODE 250

## 2024-12-20 RX ORDER — ACETAMINOPHEN 500 MG
500 TABLET ORAL EVERY 6 HOURS PRN
Qty: 20 TABLET | Refills: 0 | Status: SHIPPED | OUTPATIENT
Start: 2024-12-20

## 2024-12-20 RX ORDER — IBUPROFEN 600 MG/1
600 TABLET ORAL EVERY 6 HOURS PRN
Qty: 20 TABLET | Refills: 0 | Status: SHIPPED | OUTPATIENT
Start: 2024-12-20

## 2024-12-20 RX ORDER — ACETAMINOPHEN 500 MG
1000 TABLET ORAL
Status: COMPLETED | OUTPATIENT
Start: 2024-12-20 | End: 2024-12-20

## 2024-12-20 RX ADMIN — ACETAMINOPHEN 1000 MG: 500 TABLET, FILM COATED ORAL at 09:12

## 2024-12-20 NOTE — Clinical Note
"Rad Simms" Ender was seen and treated in our emergency department on 12/20/2024.  She may return to work on 12/26/2024.       If you have any questions or concerns, please don't hesitate to call.      Barbara Almanza PA-C"

## 2024-12-20 NOTE — Clinical Note
"Rad Handley (Deneisha) was seen and treated in our emergency department on 12/20/2024.  She may return to work on 12/27/2024.       If you have any questions or concerns, please don't hesitate to call.       RN    "

## 2024-12-21 NOTE — ED PROVIDER NOTES
CHIEF COMPLAINT:   Chief Complaint   Patient presents with    Cough     Cough, congestion, CP, body aches that started today. Pt reports son has the flu. -OTC medications       HISTORY OF PRESENT ILLNESS: This is a 39 y.o. female who presents to the emergency department today with complaint of body aches and nasal congestion that started earlier today.  Patient states that her son was diagnosed with influenza yesterday and is sick with similar symptoms.  She has not taken anything for her symptoms.  Reports a fever of 101.2 at home.  Denies chest pain, shortness of breath, N/V/D, urinary symptoms, abdominal pain.    The history is provided by the patient.    REVIEW OF SYSTEMS:  10 point ROS performed and negative except as stated in HPI     ALLERGIES REVIEWED  MEDICATIONS REVIEWED  PMH/PSH/SOC/FH REVIEWED     Nursing/Ancillary staff note reviewed.      PHYSICAL EXAM:  VS reviewed  Vitals:    12/20/24 2042   BP: (!) 146/92   Pulse: (!) 127   Resp: 19   Temp: 98.2 °F (36.8 °C)       General Appearance:  Patient is ill-appearing.  The patient is alert, has no immediate or signs of toxicity. No acute distress.    HEENT: Eyes:  With no injection, No drainage.   Throat:  Posterior oropharynx without edema, erythema, or exudate.  Uvula midline without swelling.  Normal rise of soft palate without evidence of PTA.  Neck: Neck is without stridor.   Respiratory:  Lungs clear to auscultation bilaterally.  No wheezing, rales, or rhonchi.  There are no retractions.  No respiratory distress.  Cardiovascular:  Tachycardia.  Regular rhythm.   Gastrointestinal:  Abdomen is without distention.   Neurological: Alert and oriented x 4. No focal weakness.  Skin: Warm and dry, no rashes.  Musculoskeletal: Extremities are non-swollen and have full range of motion.      History reviewed. No pertinent past medical history.    History reviewed. No pertinent surgical history.    Results for orders placed or performed during the hospital  encounter of 12/20/24   POCT COVID-19 Rapid Screening    Collection Time: 12/20/24  9:06 PM   Result Value Ref Range    POC Rapid COVID Negative Negative     Acceptable Yes    POCT Influenza A/B Molecular    Collection Time: 12/20/24  9:16 PM   Result Value Ref Range    POC Molecular Influenza A Ag Positive (A) Negative    POC Molecular Influenza B Ag Negative Negative     Acceptable Yes      Imaging Results    None       Imaging Results    None          MDM  Initial impression  Urgent evaluation of a 39 y.o. female with body aches and fever. Patient appears ill, but nontoxic. Lungs clear to auscultation bilaterally. Will go ahead and swab for covid and flu and treat supportively.    Differential diagnosis includes but is not limited to:  Bacterial/viral pharyngitis, bacterial/viral pneumonia, COVID-19, influenza, viral syndrome, sepsis, meningitis, otitis media/external, nasal polyp, bacterial sinusitis, allergic rhinitis.    ED Management:  Patient remains afebrile and nontoxic-appearing.  Flu swab positive for influenza A. Patient remains without shortness of breath or other concerning symptoms for decompensation. Tachycardia improved after treatment with Tylenol here in the ED. Patient without clinical concern for sepsis. No history of asthma, diabetes, COPD, or other comorbidities that put them at increased risk. Discussed Tamiflu, but ultimately deferred. Plan to discharge patient with instructions on supportive care at home for symptomatic management. Patient given return precautions and educated on worrisome symptoms for which they should return to the ER, including shortness of breath, chest pain, worsening cough or fever. They reported understanding and all questions were answered.         Medications   acetaminophen tablet 1,000 mg (1,000 mg Oral Given 12/20/24 2107)         Impression  Final diagnoses:  [J10.1] Influenza A (Primary)      Follow-up Information       Go to   [facility] - Emergency Dept.    Specialty: Emergency Medicine  Why: If symptoms worsen  Contact information:  [address]  [facility] [address] [phone]                             Barbara Almanza PA-C  12/20/24 2387

## 2024-12-21 NOTE — ED NOTES
Rad Handley, a 39 y.o. female presents to the ED w/ complaint of generalized body pain and cough     Triage note:  Chief Complaint   Patient presents with    Cough     Cough, congestion, CP, body aches that started today. Pt reports son has the flu. -OTC medications     Review of patient's allergies indicates:  No Known Allergies  History reviewed. No pertinent past medical history.

## 2024-12-21 NOTE — DISCHARGE INSTRUCTIONS
You have tested positive for influenza.    For body aches and fever, start taking 1000 mg Tylenol and alternate with ibuprofen 600 mg every 4 to 6 hours.    Rest and stay hydrated.     Below are suggestions for symptomatic relief:              -Salt water gargles to soothe throat pain.  -Delsym helps with coughing at night              -Chloroseptic spray also helps to numb throat pain.              -Nasal saline spray reduces inflammation and dryness.              -Warm face compresses to help with facial sinus pain/pressure.              -Vicks vapor rub at night.              -Flonase OTC or Nasacort OTC for nasal congestion.              -Zyrtec/Claritin during the day & Benadryl at night may help with allergies.   -Stay away from tobacco smoke. Smoke will make the irritation in the nose and throat worse.                 Return to the ER if your symptoms worsen or you develop any other worrisome symptoms such as shortness of breath, chest pain, severe weakness